# Patient Record
Sex: MALE | Race: WHITE | NOT HISPANIC OR LATINO
[De-identification: names, ages, dates, MRNs, and addresses within clinical notes are randomized per-mention and may not be internally consistent; named-entity substitution may affect disease eponyms.]

---

## 2017-04-25 ENCOUNTER — RX RENEWAL (OUTPATIENT)
Age: 67
End: 2017-04-25

## 2017-06-06 ENCOUNTER — APPOINTMENT (OUTPATIENT)
Dept: FAMILY MEDICINE | Facility: CLINIC | Age: 67
End: 2017-06-06

## 2017-06-06 VITALS
WEIGHT: 210 LBS | RESPIRATION RATE: 16 BRPM | BODY MASS INDEX: 31.83 KG/M2 | OXYGEN SATURATION: 98 % | HEIGHT: 68 IN | DIASTOLIC BLOOD PRESSURE: 72 MMHG | SYSTOLIC BLOOD PRESSURE: 148 MMHG | HEART RATE: 63 BPM

## 2017-06-06 VITALS — HEART RATE: 60 BPM | RESPIRATION RATE: 16 BRPM | DIASTOLIC BLOOD PRESSURE: 80 MMHG | SYSTOLIC BLOOD PRESSURE: 130 MMHG

## 2017-07-12 ENCOUNTER — RX RENEWAL (OUTPATIENT)
Age: 67
End: 2017-07-12

## 2017-10-10 ENCOUNTER — APPOINTMENT (OUTPATIENT)
Dept: FAMILY MEDICINE | Facility: CLINIC | Age: 67
End: 2017-10-10
Payer: MEDICARE

## 2017-10-10 ENCOUNTER — MED ADMIN CHARGE (OUTPATIENT)
Age: 67
End: 2017-10-10

## 2017-10-10 PROCEDURE — 90662 IIV NO PRSV INCREASED AG IM: CPT

## 2017-10-10 PROCEDURE — G0008: CPT

## 2017-10-18 ENCOUNTER — RX RENEWAL (OUTPATIENT)
Age: 67
End: 2017-10-18

## 2017-10-20 ENCOUNTER — RESULT REVIEW (OUTPATIENT)
Age: 67
End: 2017-10-20

## 2017-10-20 ENCOUNTER — RX RENEWAL (OUTPATIENT)
Age: 67
End: 2017-10-20

## 2017-12-20 ENCOUNTER — NON-APPOINTMENT (OUTPATIENT)
Age: 67
End: 2017-12-20

## 2017-12-20 ENCOUNTER — APPOINTMENT (OUTPATIENT)
Dept: FAMILY MEDICINE | Facility: CLINIC | Age: 67
End: 2017-12-20
Payer: MEDICARE

## 2017-12-20 VITALS
SYSTOLIC BLOOD PRESSURE: 132 MMHG | BODY MASS INDEX: 32.74 KG/M2 | WEIGHT: 216 LBS | DIASTOLIC BLOOD PRESSURE: 66 MMHG | HEIGHT: 68 IN

## 2017-12-20 VITALS — HEART RATE: 60 BPM | DIASTOLIC BLOOD PRESSURE: 70 MMHG | RESPIRATION RATE: 16 BRPM | SYSTOLIC BLOOD PRESSURE: 130 MMHG

## 2017-12-20 PROCEDURE — G0009: CPT

## 2017-12-20 PROCEDURE — 90732 PPSV23 VACC 2 YRS+ SUBQ/IM: CPT

## 2017-12-20 PROCEDURE — 93000 ELECTROCARDIOGRAM COMPLETE: CPT | Mod: 59

## 2017-12-20 PROCEDURE — G0439: CPT

## 2018-06-05 ENCOUNTER — APPOINTMENT (OUTPATIENT)
Dept: FAMILY MEDICINE | Facility: CLINIC | Age: 68
End: 2018-06-05
Payer: MEDICARE

## 2018-06-05 ENCOUNTER — LABORATORY RESULT (OUTPATIENT)
Age: 68
End: 2018-06-05

## 2018-06-05 VITALS — SYSTOLIC BLOOD PRESSURE: 120 MMHG | HEART RATE: 72 BPM | DIASTOLIC BLOOD PRESSURE: 70 MMHG | RESPIRATION RATE: 16 BRPM

## 2018-06-05 VITALS
WEIGHT: 211.25 LBS | HEART RATE: 67 BPM | SYSTOLIC BLOOD PRESSURE: 136 MMHG | HEIGHT: 68 IN | RESPIRATION RATE: 16 BRPM | OXYGEN SATURATION: 98 % | DIASTOLIC BLOOD PRESSURE: 70 MMHG | BODY MASS INDEX: 32.02 KG/M2

## 2018-06-05 PROCEDURE — 99214 OFFICE O/P EST MOD 30 MIN: CPT | Mod: 25

## 2018-06-05 PROCEDURE — 36415 COLL VENOUS BLD VENIPUNCTURE: CPT

## 2018-06-05 NOTE — PHYSICAL EXAM
[No Acute Distress] : no acute distress [Normal Voice/Communication] : normal voice/communication [Normal Sclera/Conjunctiva] : normal sclera/conjunctiva [Normal Outer Ear/Nose] : the outer ears and nose were normal in appearance [No JVD] : no jugular venous distention [No Respiratory Distress] : no respiratory distress  [Normal Rate] : normal rate  [Regular Rhythm] : with a regular rhythm [No Murmur] : no murmur heard [No Edema] : there was no peripheral edema [Soft] : abdomen soft [Non Tender] : non-tender [Non-distended] : non-distended [No HSM] : no HSM [Normal Supraclavicular Nodes] : no supraclavicular lymphadenopathy [Normal Posterior Cervical Nodes] : no posterior cervical lymphadenopathy [Normal Anterior Cervical Nodes] : no anterior cervical lymphadenopathy [No Joint Swelling] : no joint swelling [No Rash] : no rash [Normal Gait] : normal gait [Speech Grossly Normal] : speech grossly normal

## 2018-06-05 NOTE — HISTORY OF PRESENT ILLNESS
[Hyperlipidemia] : Hyperlipidemia [Hypertension] : Hypertension [Other: ___] : [unfilled] [Does not check BP] : The patient is not checking blood pressure [<130/90] : Target blood pressure is  <130/90 [Target goal met] : BP target goal met [Doing Well] : Patient is doing well [Low Intensity Therapy] : Patient is currently on low intensity statin  therapy [FreeTextEntry6] : still having  knee pain seeing  ortho had 2 partial knee  replacement  20  yrs  ago  has  multiple  bites on leg and thigh was in  crawl space

## 2018-06-06 LAB
ALBUMIN SERPL ELPH-MCNC: 4 G/DL
ALP BLD-CCNC: 69 U/L
ALT SERPL-CCNC: 5 U/L
ANION GAP SERPL CALC-SCNC: 15 MMOL/L
APPEARANCE: CLEAR
AST SERPL-CCNC: 14 U/L
B BURGDOR IGG+IGM SER QL IB: NORMAL
BACTERIA: NEGATIVE
BASOPHILS # BLD AUTO: 0.03 K/UL
BASOPHILS NFR BLD AUTO: 0.5 %
BILIRUB SERPL-MCNC: 0.4 MG/DL
BILIRUBIN URINE: NEGATIVE
BLOOD URINE: ABNORMAL
BUN SERPL-MCNC: 20 MG/DL
CALCIUM SERPL-MCNC: 9.7 MG/DL
CHLORIDE SERPL-SCNC: 107 MMOL/L
CHOLEST SERPL-MCNC: 150 MG/DL
CHOLEST/HDLC SERPL: 2.7 RATIO
CO2 SERPL-SCNC: 23 MMOL/L
COLOR: YELLOW
CREAT SERPL-MCNC: 1.01 MG/DL
EOSINOPHIL # BLD AUTO: 0.11 K/UL
EOSINOPHIL NFR BLD AUTO: 2 %
GLUCOSE QUALITATIVE U: NEGATIVE MG/DL
GLUCOSE SERPL-MCNC: 89 MG/DL
HCT VFR BLD CALC: 43.7 %
HDLC SERPL-MCNC: 56 MG/DL
HGB BLD-MCNC: 14 G/DL
HYALINE CASTS: 0 /LPF
IMM GRANULOCYTES NFR BLD AUTO: 0.2 %
KETONES URINE: NEGATIVE
LDLC SERPL CALC-MCNC: 63 MG/DL
LEUKOCYTE ESTERASE URINE: NEGATIVE
LYMPHOCYTES # BLD AUTO: 1.08 K/UL
LYMPHOCYTES NFR BLD AUTO: 19.5 %
MAN DIFF?: NORMAL
MCHC RBC-ENTMCNC: 30.8 PG
MCHC RBC-ENTMCNC: 32 GM/DL
MCV RBC AUTO: 96 FL
MICROSCOPIC-UA: NORMAL
MONOCYTES # BLD AUTO: 0.62 K/UL
MONOCYTES NFR BLD AUTO: 11.2 %
NEUTROPHILS # BLD AUTO: 3.68 K/UL
NEUTROPHILS NFR BLD AUTO: 66.6 %
NITRITE URINE: NEGATIVE
PH URINE: 5.5
PLATELET # BLD AUTO: 143 K/UL
POTASSIUM SERPL-SCNC: 4.3 MMOL/L
PROT SERPL-MCNC: 7.2 G/DL
PROTEIN URINE: NEGATIVE MG/DL
RBC # BLD: 4.55 M/UL
RBC # FLD: 14.4 %
RED BLOOD CELLS URINE: 1 /HPF
SODIUM SERPL-SCNC: 145 MMOL/L
SPECIFIC GRAVITY URINE: 1.02
SQUAMOUS EPITHELIAL CELLS: 0 /HPF
T4 SERPL-MCNC: 8.4 UG/DL
TRIGL SERPL-MCNC: 155 MG/DL
TSH SERPL-ACNC: 0.81 UIU/ML
URATE SERPL-MCNC: 6.3 MG/DL
UROBILINOGEN URINE: NEGATIVE MG/DL
WBC # FLD AUTO: 5.53 K/UL
WHITE BLOOD CELLS URINE: 0 /HPF

## 2018-08-22 ENCOUNTER — APPOINTMENT (OUTPATIENT)
Dept: FAMILY MEDICINE | Facility: CLINIC | Age: 68
End: 2018-08-22
Payer: MEDICARE

## 2018-08-22 VITALS
BODY MASS INDEX: 31.83 KG/M2 | WEIGHT: 210 LBS | SYSTOLIC BLOOD PRESSURE: 130 MMHG | DIASTOLIC BLOOD PRESSURE: 80 MMHG | HEIGHT: 68 IN

## 2018-08-22 PROCEDURE — 12001 RPR S/N/AX/GEN/TRNK 2.5CM/<: CPT

## 2018-08-22 PROCEDURE — 99213 OFFICE O/P EST LOW 20 MIN: CPT | Mod: 25

## 2018-08-22 NOTE — ASSESSMENT
[FreeTextEntry1] : laceration  through left  4th  digit  2.5  cm sutured  closed with 3  4-0 nylon  sutures under local and  tube  gauze applied rtc  3-5  days

## 2018-08-27 ENCOUNTER — APPOINTMENT (OUTPATIENT)
Dept: FAMILY MEDICINE | Facility: CLINIC | Age: 68
End: 2018-08-27
Payer: MEDICARE

## 2018-08-27 VITALS
SYSTOLIC BLOOD PRESSURE: 138 MMHG | WEIGHT: 217.38 LBS | HEIGHT: 68 IN | DIASTOLIC BLOOD PRESSURE: 82 MMHG | HEART RATE: 62 BPM | BODY MASS INDEX: 32.94 KG/M2 | OXYGEN SATURATION: 97 %

## 2018-08-27 PROCEDURE — 99212 OFFICE O/P EST SF 10 MIN: CPT

## 2018-08-27 NOTE — HISTORY OF PRESENT ILLNESS
[FreeTextEntry1] : f/u  dog  bite  [de-identified] : no pain   wound  looks  good no sign of  infection sutures in place wound  closed

## 2018-09-04 ENCOUNTER — APPOINTMENT (OUTPATIENT)
Dept: FAMILY MEDICINE | Facility: CLINIC | Age: 68
End: 2018-09-04
Payer: MEDICARE

## 2018-09-04 VITALS — SYSTOLIC BLOOD PRESSURE: 132 MMHG | DIASTOLIC BLOOD PRESSURE: 80 MMHG

## 2018-09-04 DIAGNOSIS — S61.219A LACERATION W/OUT FOREIGN BODY OF UNSPECIFIED FINGER W/OUT DAMAGE TO NAIL, INITIAL ENCOUNTER: ICD-10-CM

## 2018-09-04 PROCEDURE — 99212 OFFICE O/P EST SF 10 MIN: CPT

## 2018-09-04 NOTE — HISTORY OF PRESENT ILLNESS
[FreeTextEntry1] : f/u  dog  bite  [de-identified] : sutures  removed  from left  4th  digit wound  closed  healed  well wound  steri  stripped

## 2018-10-03 ENCOUNTER — MED ADMIN CHARGE (OUTPATIENT)
Age: 68
End: 2018-10-03

## 2018-10-03 ENCOUNTER — APPOINTMENT (OUTPATIENT)
Dept: FAMILY MEDICINE | Facility: CLINIC | Age: 68
End: 2018-10-03
Payer: MEDICARE

## 2018-10-03 PROCEDURE — G0008: CPT

## 2018-10-03 PROCEDURE — 90662 IIV NO PRSV INCREASED AG IM: CPT

## 2019-01-07 ENCOUNTER — APPOINTMENT (OUTPATIENT)
Dept: FAMILY MEDICINE | Facility: CLINIC | Age: 69
End: 2019-01-07
Payer: MEDICARE

## 2019-01-07 VITALS — RESPIRATION RATE: 16 BRPM | SYSTOLIC BLOOD PRESSURE: 120 MMHG | DIASTOLIC BLOOD PRESSURE: 80 MMHG | HEART RATE: 60 BPM

## 2019-01-07 VITALS
HEART RATE: 58 BPM | HEIGHT: 68 IN | TEMPERATURE: 98.4 F | SYSTOLIC BLOOD PRESSURE: 118 MMHG | BODY MASS INDEX: 31.83 KG/M2 | DIASTOLIC BLOOD PRESSURE: 70 MMHG | OXYGEN SATURATION: 97 % | WEIGHT: 210 LBS | RESPIRATION RATE: 16 BRPM

## 2019-01-07 PROCEDURE — 99214 OFFICE O/P EST MOD 30 MIN: CPT

## 2019-01-07 RX ORDER — AMOXICILLIN AND CLAVULANATE POTASSIUM 875; 125 MG/1; MG/1
875-125 TABLET, COATED ORAL
Qty: 14 | Refills: 0 | Status: COMPLETED | COMMUNITY
Start: 2018-08-22 | End: 2019-01-07

## 2019-01-07 NOTE — HISTORY OF PRESENT ILLNESS
[Hyperlipidemia] : Hyperlipidemia [Hypertension] : Hypertension [Other: ___] : [unfilled] [Cold Symptoms] : cold symptoms [Moderate] : moderate [___ Days ago] : [unfilled] days ago [Congestion] : congestion [Cough] : cough [Does not check BP] : The patient is not checking blood pressure [<130/90] : Target blood pressure is  <130/90 [Target goal met] : BP target goal met [Doing Well] : Patient is doing well [Low Intensity Therapy] : Patient is currently on low intensity statin  therapy [Sore Throat] : no sore throat

## 2019-09-05 ENCOUNTER — APPOINTMENT (OUTPATIENT)
Dept: FAMILY MEDICINE | Facility: CLINIC | Age: 69
End: 2019-09-05
Payer: MEDICARE

## 2019-09-05 VITALS — SYSTOLIC BLOOD PRESSURE: 130 MMHG | DIASTOLIC BLOOD PRESSURE: 80 MMHG | HEART RATE: 72 BPM | RESPIRATION RATE: 16 BRPM

## 2019-09-05 VITALS
HEART RATE: 66 BPM | BODY MASS INDEX: 33.04 KG/M2 | OXYGEN SATURATION: 97 % | RESPIRATION RATE: 16 BRPM | SYSTOLIC BLOOD PRESSURE: 132 MMHG | HEIGHT: 68 IN | DIASTOLIC BLOOD PRESSURE: 73 MMHG | WEIGHT: 218 LBS

## 2019-09-05 DIAGNOSIS — H33.20 SEROUS RETINAL DETACHMENT, UNSPECIFIED EYE: ICD-10-CM

## 2019-09-05 PROCEDURE — 99214 OFFICE O/P EST MOD 30 MIN: CPT

## 2019-09-05 RX ORDER — BENZONATATE 200 MG/1
200 CAPSULE ORAL 3 TIMES DAILY
Qty: 30 | Refills: 1 | Status: COMPLETED | COMMUNITY
Start: 2019-01-07 | End: 2019-09-05

## 2019-09-05 RX ORDER — AMOXICILLIN 875 MG/1
875 TABLET, FILM COATED ORAL
Qty: 1 | Refills: 0 | Status: COMPLETED | COMMUNITY
Start: 2019-01-07 | End: 2019-09-05

## 2019-09-05 NOTE — PHYSICAL EXAM
[No Acute Distress] : no acute distress [PERRL] : pupils equal round and reactive to light [Normal Oropharynx] : the oropharynx was normal [No Lymphadenopathy] : no lymphadenopathy [No Accessory Muscle Use] : no accessory muscle use [Regular Rhythm] : with a regular rhythm [No Edema] : there was no peripheral edema [Soft] : abdomen soft [No HSM] : no HSM [Normal] : affect was normal and insight and judgment were intact

## 2019-09-05 NOTE — HISTORY OF PRESENT ILLNESS
[Hyperlipidemia] : Hyperlipidemia [Hypertension] : Hypertension [Other: ___] : [unfilled] [Does not check BP] : The patient is not checking blood pressure [<130/90] : Target blood pressure is  <130/90 [Target goal met] : BP target goal met [Doing Well] : Patient is doing well [Managed with medications] : managed with  medication [Low Intensity Therapy] : Patient is currently on low intensity statin  therapy [FreeTextEntry6] : has  trouble with eyes  has glaucoma and retinal detachment may need  glaucoma surgery   started on methazolamide 50 mg bid  sees onc  q year concerned about  new  glaucoma med methalamide

## 2019-09-05 NOTE — ASSESSMENT
[FreeTextEntry1] : leukemia  cured  medically stable  continue all meds\par will monitor  labs  with new  glaucoma  med bp and lipids at goal

## 2019-10-03 ENCOUNTER — APPOINTMENT (OUTPATIENT)
Dept: FAMILY MEDICINE | Facility: CLINIC | Age: 69
End: 2019-10-03
Payer: MEDICARE

## 2019-10-03 PROCEDURE — G0008: CPT

## 2019-10-03 PROCEDURE — 90662 IIV NO PRSV INCREASED AG IM: CPT

## 2020-06-11 ENCOUNTER — APPOINTMENT (OUTPATIENT)
Dept: FAMILY MEDICINE | Facility: CLINIC | Age: 70
End: 2020-06-11
Payer: MEDICARE

## 2020-06-11 VITALS
BODY MASS INDEX: 32.43 KG/M2 | WEIGHT: 214 LBS | HEART RATE: 61 BPM | SYSTOLIC BLOOD PRESSURE: 126 MMHG | HEIGHT: 68 IN | OXYGEN SATURATION: 98 % | DIASTOLIC BLOOD PRESSURE: 80 MMHG | TEMPERATURE: 98.5 F

## 2020-06-11 PROCEDURE — 99213 OFFICE O/P EST LOW 20 MIN: CPT

## 2020-08-07 ENCOUNTER — APPOINTMENT (OUTPATIENT)
Dept: FAMILY MEDICINE | Facility: CLINIC | Age: 70
End: 2020-08-07
Payer: MEDICARE

## 2020-08-07 VITALS
OXYGEN SATURATION: 98 % | DIASTOLIC BLOOD PRESSURE: 62 MMHG | HEART RATE: 59 BPM | SYSTOLIC BLOOD PRESSURE: 120 MMHG | BODY MASS INDEX: 31.52 KG/M2 | HEIGHT: 68 IN | WEIGHT: 208 LBS

## 2020-08-07 PROCEDURE — 99213 OFFICE O/P EST LOW 20 MIN: CPT | Mod: 25

## 2020-08-07 PROCEDURE — 96372 THER/PROPH/DIAG INJ SC/IM: CPT

## 2020-08-07 RX ORDER — METHYLPRED ACET/NACL,ISO-OS/PF 40 MG/ML
40 VIAL (ML) INJECTION
Qty: 1 | Refills: 0 | Status: COMPLETED | OUTPATIENT
Start: 2020-08-07

## 2020-08-07 RX ADMIN — METHYLPREDNISOLONE ACETATE 0 MG/ML: 40 INJECTION, SUSPENSION INTRA-ARTICULAR; INTRALESIONAL; INTRAMUSCULAR; SOFT TISSUE at 00:00

## 2020-08-07 NOTE — PHYSICAL EXAM
[Normal] : normal rate, regular rhythm, normal S1 and S2 and no murmur heard [de-identified] : Rash on right wrist and both hands. Large area of oozing and crusting on left forearm.

## 2020-08-07 NOTE — HISTORY OF PRESENT ILLNESS
[FreeTextEntry8] : Rash on left forearm and hands after gardening and contact with poison ivy. Large blistered oozing area on left forearm. Very itchy, using anti-itch cream. \par Needs refill on medications and due for annual in September. 
Statement Selected

## 2020-09-01 ENCOUNTER — APPOINTMENT (OUTPATIENT)
Dept: FAMILY MEDICINE | Facility: CLINIC | Age: 70
End: 2020-09-01
Payer: MEDICARE

## 2020-09-01 VITALS
OXYGEN SATURATION: 98 % | SYSTOLIC BLOOD PRESSURE: 122 MMHG | HEART RATE: 59 BPM | WEIGHT: 209 LBS | HEIGHT: 68 IN | DIASTOLIC BLOOD PRESSURE: 82 MMHG | BODY MASS INDEX: 31.67 KG/M2
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
## 2020-09-01 DIAGNOSIS — S61.452A OPEN BITE OF LEFT HAND, INITIAL ENCOUNTER: ICD-10-CM
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
## 2020-09-01 DIAGNOSIS — R07.81 PLEURODYNIA: ICD-10-CM

## 2020-09-01 DIAGNOSIS — Z87.09 PERSONAL HISTORY OF OTHER DISEASES OF THE RESPIRATORY SYSTEM: ICD-10-CM
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
## 2020-09-01 DIAGNOSIS — L23.7 ALLERGIC CONTACT DERMATITIS DUE TO PLANTS, EXCEPT FOOD: ICD-10-CM

## 2020-09-01 DIAGNOSIS — Z01.818 ENCOUNTER FOR OTHER PREPROCEDURAL EXAMINATION: ICD-10-CM

## 2020-09-01 PROCEDURE — 99213 OFFICE O/P EST LOW 20 MIN: CPT

## 2020-09-01 RX ORDER — DOXYCYCLINE HYCLATE 100 MG/1
100 CAPSULE ORAL
Qty: 10 | Refills: 0 | Status: DISCONTINUED | COMMUNITY
Start: 2020-06-11 | End: 2020-09-01

## 2020-09-01 NOTE — PLAN
[FreeTextEntry1] : Continue with Aleve for pain. Topical pain relief,\par Continue with steroid cream for bug bite itching.

## 2020-09-01 NOTE — HISTORY OF PRESENT ILLNESS
[Spouse] : spouse [FreeTextEntry8] : Fell off the couch when sleeping and landed on the floor four days ago. Pain left lateral chest with movement. Able to take deep breaths, no SOB.\par Pain when trying to drive. Relief with Aleve. No bruising noted. \par Did not hit head, no LOC. \par Itchy bug bites on left inner thigh. House checked for bug bites but none found. Busy in garden outside. Itching resolved with steroid cream.

## 2020-09-01 NOTE — PHYSICAL EXAM
[Normal] : affect was normal and insight and judgment were intact [de-identified] : Tender left lateral ribs. Tender between ribs at site of bruising. FROM but mild pain with trunk rotation and arm lift.  [de-identified] : Small bruise left lateral chest.  Three bug bite marks on left ineer thigh

## 2020-09-24 ENCOUNTER — APPOINTMENT (OUTPATIENT)
Dept: FAMILY MEDICINE | Facility: CLINIC | Age: 70
End: 2020-09-24
Payer: MEDICARE

## 2020-09-24 DIAGNOSIS — Z23 ENCOUNTER FOR IMMUNIZATION: ICD-10-CM

## 2020-09-24 PROCEDURE — 90662 IIV NO PRSV INCREASED AG IM: CPT

## 2020-09-24 PROCEDURE — G0008: CPT

## 2020-10-06 ENCOUNTER — APPOINTMENT (OUTPATIENT)
Dept: FAMILY MEDICINE | Facility: CLINIC | Age: 70
End: 2020-10-06

## 2020-11-16 ENCOUNTER — NON-APPOINTMENT (OUTPATIENT)
Age: 70
End: 2020-11-16

## 2020-11-16 ENCOUNTER — APPOINTMENT (OUTPATIENT)
Dept: FAMILY MEDICINE | Facility: CLINIC | Age: 70
End: 2020-11-16
Payer: MEDICARE

## 2020-11-16 VITALS
HEART RATE: 58 BPM | SYSTOLIC BLOOD PRESSURE: 132 MMHG | WEIGHT: 217.5 LBS | TEMPERATURE: 98 F | OXYGEN SATURATION: 98 % | HEIGHT: 68 IN | BODY MASS INDEX: 32.96 KG/M2 | DIASTOLIC BLOOD PRESSURE: 70 MMHG

## 2020-11-16 VITALS — HEART RATE: 72 BPM | DIASTOLIC BLOOD PRESSURE: 72 MMHG | SYSTOLIC BLOOD PRESSURE: 130 MMHG | RESPIRATION RATE: 16 BRPM

## 2020-11-16 PROCEDURE — G0439: CPT

## 2020-11-16 PROCEDURE — 99497 ADVNCD CARE PLAN 30 MIN: CPT | Mod: 33

## 2020-11-16 PROCEDURE — 93000 ELECTROCARDIOGRAM COMPLETE: CPT | Mod: 59

## 2020-11-16 PROCEDURE — 99213 OFFICE O/P EST LOW 20 MIN: CPT | Mod: 25

## 2020-11-16 RX ORDER — TRIAMCINOLONE ACETONIDE 1 MG/G
0.1 OINTMENT TOPICAL
Qty: 1 | Refills: 1 | Status: COMPLETED | COMMUNITY
Start: 2020-08-07 | End: 2020-11-16

## 2020-11-16 NOTE — HISTORY OF PRESENT ILLNESS
[Spouse] : spouse [FreeTextEntry1] : pt  here for cpe and  management of HTN HLD  [de-identified] : FEELING WELL has  soft  bm

## 2020-11-16 NOTE — REVIEW OF SYSTEMS
[Vision Problems] : vision problems [Negative] : Heme/Lymph [FreeTextEntry3] : retinal detachment and glaucoma os

## 2020-11-16 NOTE — PHYSICAL EXAM
[No Acute Distress] : no acute distress [PERRL] : pupils equal round and reactive to light [Normal Oropharynx] : the oropharynx was normal [Supple] : supple [Clear to Auscultation] : lungs were clear to auscultation bilaterally [Regular Rhythm] : with a regular rhythm [No Murmur] : no murmur heard [No Edema] : there was no peripheral edema [Soft] : abdomen soft [Non Tender] : non-tender [No HSM] : no HSM [Urethral Meatus] : meatus normal [Urinary Bladder Findings] : the bladder was normal on palpation [Scrotum] : the scrotum was normal [Testes Mass (___cm)] : there were no testicular masses [No Prostate Nodules] : no prostate nodules [Normal Supraclavicular Nodes] : no supraclavicular lymphadenopathy [Normal Posterior Cervical Nodes] : no posterior cervical lymphadenopathy [Normal Anterior Cervical Nodes] : no anterior cervical lymphadenopathy [No CVA Tenderness] : no CVA  tenderness [No Spinal Tenderness] : no spinal tenderness [Grossly Normal Strength/Tone] : grossly normal strength/tone [No Skin Lesions] : no skin lesions [Normal Insight/Judgement] : insight and judgment were intact

## 2020-11-16 NOTE — ASSESSMENT
[FreeTextEntry1] : medically stable  continue all meds soft  stool start metamucil medically stable  continue all meds\par lab evaluation\par \par

## 2020-11-16 NOTE — HEALTH RISK ASSESSMENT
[Very Good] : ~his/her~  mood as very good [Yes] : Yes [Monthly or less (1 pt)] : Monthly or less (1 point) [No falls in past year] : Patient reported no falls in the past year [0] : 2) Feeling down, depressed, or hopeless: Not at all (0) [None] : None [With Family] : lives with family [Retired] : retired [College] : College [] :  [# Of Children ___] : has [unfilled] children [Smoke Detector] : smoke detector [Carbon Monoxide Detector] : carbon monoxide detector [Seat Belt] :  uses seat belt [Patient/Caregiver unclear of wishes] : Patient/Caregiver unclear of wishes [] : No [de-identified] : NO  [Change in mental status noted] : No change in mental status noted [Reports changes in hearing] : Reports no changes in hearing [Reports changes in vision] : Reports no changes in vision [Reports changes in dental health] : Reports no changes in dental health [ColonoscopyDate] : 12/18 [AdvancecareDate] : 11/11 [FreeTextEntry4] : has  info

## 2020-12-02 ENCOUNTER — RX RENEWAL (OUTPATIENT)
Age: 70
End: 2020-12-02

## 2021-01-07 ENCOUNTER — APPOINTMENT (OUTPATIENT)
Dept: FAMILY MEDICINE | Facility: CLINIC | Age: 71
End: 2021-01-07
Payer: MEDICARE

## 2021-01-07 DIAGNOSIS — Z20.822 CONTACT WITH AND (SUSPECTED) EXPOSURE TO COVID-19: ICD-10-CM

## 2021-01-07 DIAGNOSIS — B34.9 VIRAL INFECTION, UNSPECIFIED: ICD-10-CM

## 2021-01-07 PROCEDURE — 99211 OFF/OP EST MAY X REQ PHY/QHP: CPT | Mod: CS

## 2021-01-09 ENCOUNTER — RX RENEWAL (OUTPATIENT)
Age: 71
End: 2021-01-09

## 2021-01-09 LAB — SARS-COV-2 N GENE NPH QL NAA+PROBE: NOT DETECTED

## 2021-01-12 PROBLEM — B34.9 VIRAL ILLNESS: Status: ACTIVE | Noted: 2021-01-12

## 2021-01-13 ENCOUNTER — APPOINTMENT (OUTPATIENT)
Dept: FAMILY MEDICINE | Facility: CLINIC | Age: 71
End: 2021-01-13
Payer: MEDICARE

## 2021-01-13 PROCEDURE — 99211 OFF/OP EST MAY X REQ PHY/QHP: CPT | Mod: CS

## 2021-01-15 ENCOUNTER — TRANSCRIPTION ENCOUNTER (OUTPATIENT)
Age: 71
End: 2021-01-15

## 2021-01-15 LAB — SARS-COV-2 N GENE NPH QL NAA+PROBE: DETECTED

## 2021-01-17 ENCOUNTER — APPOINTMENT (OUTPATIENT)
Dept: DISASTER EMERGENCY | Facility: HOSPITAL | Age: 71
End: 2021-01-17

## 2021-01-17 ENCOUNTER — OUTPATIENT (OUTPATIENT)
Dept: OUTPATIENT SERVICES | Facility: HOSPITAL | Age: 71
LOS: 1 days | End: 2021-01-17
Payer: MEDICARE

## 2021-01-17 VITALS
OXYGEN SATURATION: 97 % | WEIGHT: 210.1 LBS | HEIGHT: 68 IN | HEART RATE: 68 BPM | DIASTOLIC BLOOD PRESSURE: 76 MMHG | TEMPERATURE: 98 F | SYSTOLIC BLOOD PRESSURE: 127 MMHG | RESPIRATION RATE: 18 BRPM

## 2021-01-17 VITALS
RESPIRATION RATE: 18 BRPM | SYSTOLIC BLOOD PRESSURE: 143 MMHG | HEART RATE: 62 BPM | OXYGEN SATURATION: 97 % | TEMPERATURE: 99 F | DIASTOLIC BLOOD PRESSURE: 75 MMHG

## 2021-01-17 DIAGNOSIS — U07.1 COVID-19: ICD-10-CM

## 2021-01-17 DIAGNOSIS — Z98.890 OTHER SPECIFIED POSTPROCEDURAL STATES: Chronic | ICD-10-CM

## 2021-01-17 DIAGNOSIS — Z96.659 PRESENCE OF UNSPECIFIED ARTIFICIAL KNEE JOINT: Chronic | ICD-10-CM

## 2021-01-17 DIAGNOSIS — Z41.9 ENCOUNTER FOR PROCEDURE FOR PURPOSES OTHER THAN REMEDYING HEALTH STATE, UNSPECIFIED: Chronic | ICD-10-CM

## 2021-01-17 DIAGNOSIS — Z98.49 CATARACT EXTRACTION STATUS, UNSPECIFIED EYE: Chronic | ICD-10-CM

## 2021-01-17 PROCEDURE — M0243: CPT

## 2021-01-17 NOTE — CHART NOTE - NSCHARTNOTEFT_GEN_A_CORE
CC: Monoclonal Antibody Infusion/COVID 19 Positive  70y Male          PE:   Appearance: NAD	  HEENT:   Normal oral mucosa,   Lymphatic: No lymphadenopathy  Cardiovascular: Normal S1 S2, No JVD, No murmurs, No edema  Respiratory: Lungs clear to auscultation	  Gastrointestinal:  Soft, Non-tender, + BS	  Skin: warm and dry, no rash or urticaria   Neurologic: Non-focal  Extremities: Normal range of motion,    ASSESSMENT:  Pt is a 69 yo male with a PMHx of Hypertension, Glaucoma , HLD on Atenelol, Amlodipine , HCTZ  Covid + on 1/12/21  referred by Dr. Ezio Randall who presents to infusion center for Monoclonal antibody infusion (Regeneron).    Symptoms/ Criteria: Fever, congestion,headaches  Risk Profile includes: age > 64 yo    PLAN:  - infusion procedure explained to patient I have reviewed the Regeneron Emergency Use Authorization (EUA) and I have provided the patient or patient's caregiver with the following information:      1. FDA has authorized emergency use of Regeneron( Casirivimab and Imdevimab), which is not an FDA-approved biological product.  2. The patient or patient's caregiver has the option to accept or refuse administration of Regeneron.  3. The significant known and potential risks and benefits of Regeneron and the extent to which such risks and benefits are unknown.  4. Information on available alternative treatments were discussed and recommended to visit https://www.zdgga24plumpsaeyuutvxlbwnk.nih.gov/ for further understanding    - Consent for monoclonal antibody infusion obtained   - Risk & benefits discussed/all questions answered  - infuse Regeneron (casirivimab 1200mg & imdevimab 1200mg) IV over one hour  - observe patient for one hour post infusion, and then if stable discharge home with oupt follow up as planned by PMD.    ADDENDUM CC: Monoclonal Antibody Infusion/COVID 19 Positive  70y Male    Vital Signs Last 24 Hrs  T(C): 37 (17 Jan 2021 14:02), Max: 37.1 (17 Jan 2021 12:00)  T(F): 98.6 (17 Jan 2021 14:02), Max: 98.7 (17 Jan 2021 12:00)  HR: 62 (17 Jan 2021 14:02) (59 - 68)  BP: 143/75 (17 Jan 2021 14:02) (127/76 - 143/75)  BP(mean): --  RR: 18 (17 Jan 2021 14:02) (18 - 18)  SpO2: 97% (17 Jan 2021 14:02) (96% - 97%)      PE:   Appearance: NAD	  HEENT:   Normal oral mucosa,   Lymphatic: No lymphadenopathy  Cardiovascular: Normal S1 S2, No JVD, No murmurs, No edema  Respiratory: Lungs clear to auscultation	  Gastrointestinal:  Soft, Non-tender, + BS	  Skin: warm and dry, no rash or urticaria   Neurologic: Non-focal  Extremities: Normal range of motion,    ASSESSMENT:  Pt is a 71 yo male with a PMHx of Hypertension, Glaucoma , HLD on Atenelol, Amlodipine , HCTZ  Covid + on 1/12/21  referred by Dr. Ezio Randall who presents to infusion center for Monoclonal antibody infusion (Regeneron).    Symptoms/ Criteria: Fever, congestion,headaches  Risk Profile includes: age > 64 yo    PLAN:  - infusion procedure explained to patient I have reviewed the Regeneron Emergency Use Authorization (EUA) and I have provided the patient or patient's caregiver with the following information:      1. FDA has authorized emergency use of Regeneron( Casirivimab and Imdevimab), which is not an FDA-approved biological product.  2. The patient or patient's caregiver has the option to accept or refuse administration of Regeneron.  3. The significant known and potential risks and benefits of Regeneron and the extent to which such risks and benefits are unknown.  4. Information on available alternative treatments were discussed and recommended to visit https://www.xqlof01qpdujzzcwesgdacfxmz.nih.gov/ for further understanding    - Consent for monoclonal antibody infusion obtained   - Risk & benefits discussed/all questions answered  - infuse Regeneron (casirivimab 1200mg & imdevimab 1200mg) IV over one hour  - observe patient for one hour post infusion, and then if stable discharge home with oupt follow up as planned by PMD.    ADDENDUM    Regeneron infusion and 1 hour post infusion protocol complete.  Pt is stable and offers no new complaints.  VSS/PE unchanged    Vital Signs Last 24 Hrs  T(C): 37 (17 Jan 2021 14:02), Max: 37.1 (17 Jan 2021 12:00)  T(F): 98.6 (17 Jan 2021 14:02), Max: 98.7 (17 Jan 2021 12:00)  HR: 62 (17 Jan 2021 14:02) (59 - 68)  BP: 143/75 (17 Jan 2021 14:02) (127/76 - 143/75)  BP(mean): --  RR: 18 (17 Jan 2021 14:02) (18 - 18)  SpO2: 97% (17 Jan 2021 14:02) (96% - 97%)        - Pt is stable for discharge  - Discharge instructions reviewed with pt, all questions answered

## 2021-01-18 ENCOUNTER — TRANSCRIPTION ENCOUNTER (OUTPATIENT)
Age: 71
End: 2021-01-18

## 2021-01-26 ENCOUNTER — TRANSCRIPTION ENCOUNTER (OUTPATIENT)
Age: 71
End: 2021-01-26

## 2021-04-08 ENCOUNTER — APPOINTMENT (OUTPATIENT)
Dept: FAMILY MEDICINE | Facility: CLINIC | Age: 71
End: 2021-04-08
Payer: MEDICARE

## 2021-04-08 VITALS
TEMPERATURE: 97.7 F | OXYGEN SATURATION: 98 % | WEIGHT: 210 LBS | BODY MASS INDEX: 31.83 KG/M2 | HEART RATE: 59 BPM | HEIGHT: 68 IN | DIASTOLIC BLOOD PRESSURE: 70 MMHG | SYSTOLIC BLOOD PRESSURE: 130 MMHG

## 2021-04-08 VITALS — HEART RATE: 72 BPM | RESPIRATION RATE: 16 BRPM | SYSTOLIC BLOOD PRESSURE: 110 MMHG | DIASTOLIC BLOOD PRESSURE: 70 MMHG

## 2021-04-08 DIAGNOSIS — H40.9 UNSPECIFIED GLAUCOMA: ICD-10-CM

## 2021-04-08 PROCEDURE — 99214 OFFICE O/P EST MOD 30 MIN: CPT | Mod: CS

## 2021-04-08 NOTE — HISTORY OF PRESENT ILLNESS
[Hyperlipidemia] : Hyperlipidemia [Hypertension] : Hypertension [Spouse] : spouse [FreeTextEntry6] : being treated for glaucoma and retinal detachment on methalazone

## 2021-04-08 NOTE — PHYSICAL EXAM
[PERRL] : pupils equal round and reactive to light [Normal Oropharynx] : the oropharynx was normal [Supple] : supple [Clear to Auscultation] : lungs were clear to auscultation bilaterally [Regular Rhythm] : with a regular rhythm [No Murmur] : no murmur heard [No Edema] : there was no peripheral edema [Soft] : abdomen soft [Non Tender] : non-tender [No HSM] : no HSM [Normal Supraclavicular Nodes] : no supraclavicular lymphadenopathy [Normal Posterior Cervical Nodes] : no posterior cervical lymphadenopathy [Normal Anterior Cervical Nodes] : no anterior cervical lymphadenopathy [No CVA Tenderness] : no CVA  tenderness [No Spinal Tenderness] : no spinal tenderness [Grossly Normal Strength/Tone] : grossly normal strength/tone [No Skin Lesions] : no skin lesions [Normal Insight/Judgement] : insight and judgment were intact

## 2021-04-08 NOTE — REVIEW OF SYSTEMS
[Vision Problems] : vision problems [Negative] : Heme/Lymph [Chest Pain] : no chest pain [Palpitations] : no palpitations [Shortness Of Breath] : no shortness of breath [Abdominal Pain] : no abdominal pain [FreeTextEntry3] : retinal detachment and glaucoma os

## 2021-04-08 NOTE — ASSESSMENT
[FreeTextEntry1] : pt on 2 diuretics of glaucoma and htn need to have renal function monitored lab evaluation\par

## 2021-04-09 LAB
ALBUMIN SERPL ELPH-MCNC: 4.4 G/DL
ALP BLD-CCNC: 75 U/L
ALT SERPL-CCNC: 5 U/L
ANION GAP SERPL CALC-SCNC: 10 MMOL/L
APPEARANCE: CLEAR
AST SERPL-CCNC: 17 U/L
BACTERIA: NEGATIVE
BASOPHILS # BLD AUTO: 0.07 K/UL
BASOPHILS NFR BLD AUTO: 1.1 %
BILIRUB SERPL-MCNC: 0.5 MG/DL
BILIRUBIN URINE: NEGATIVE
BLOOD URINE: NEGATIVE
BUN SERPL-MCNC: 16 MG/DL
CALCIUM SERPL-MCNC: 9.2 MG/DL
CHLORIDE SERPL-SCNC: 108 MMOL/L
CHOLEST SERPL-MCNC: 160 MG/DL
CO2 SERPL-SCNC: 26 MMOL/L
COLOR: YELLOW
COVID-19 NUCLEOCAPSID  GAM ANTIBODY INTERPRETATION: POSITIVE
CREAT SERPL-MCNC: 1.07 MG/DL
EOSINOPHIL # BLD AUTO: 0.14 K/UL
EOSINOPHIL NFR BLD AUTO: 2.2 %
GLUCOSE QUALITATIVE U: NEGATIVE
GLUCOSE SERPL-MCNC: 56 MG/DL
HCT VFR BLD CALC: 44.8 %
HDLC SERPL-MCNC: 33 MG/DL
HGB BLD-MCNC: 14.4 G/DL
HYALINE CASTS: 1 /LPF
IMM GRANULOCYTES NFR BLD AUTO: 0.2 %
KETONES URINE: NEGATIVE
LDLC SERPL CALC-MCNC: 88 MG/DL
LEUKOCYTE ESTERASE URINE: NEGATIVE
LYMPHOCYTES # BLD AUTO: 1.57 K/UL
LYMPHOCYTES NFR BLD AUTO: 25.2 %
MAN DIFF?: NORMAL
MCHC RBC-ENTMCNC: 30.7 PG
MCHC RBC-ENTMCNC: 32.1 GM/DL
MCV RBC AUTO: 95.5 FL
MICROSCOPIC-UA: NORMAL
MONOCYTES # BLD AUTO: 0.85 K/UL
MONOCYTES NFR BLD AUTO: 13.6 %
NEUTROPHILS # BLD AUTO: 3.59 K/UL
NEUTROPHILS NFR BLD AUTO: 57.7 %
NITRITE URINE: NEGATIVE
NONHDLC SERPL-MCNC: 127 MG/DL
PH URINE: 7.5
PLATELET # BLD AUTO: 148 K/UL
POTASSIUM SERPL-SCNC: 5.6 MMOL/L
PROT SERPL-MCNC: 7.1 G/DL
PROTEIN URINE: NORMAL
RBC # BLD: 4.69 M/UL
RBC # FLD: 14.7 %
RED BLOOD CELLS URINE: 1 /HPF
SARS-COV-2 AB SERPL QL IA: 183 INDEX
SODIUM SERPL-SCNC: 144 MMOL/L
SPECIFIC GRAVITY URINE: 1.02
SQUAMOUS EPITHELIAL CELLS: 0 /HPF
T4 SERPL-MCNC: 9 UG/DL
TRIGL SERPL-MCNC: 193 MG/DL
TSH SERPL-ACNC: 1.07 UIU/ML
URATE SERPL-MCNC: 6.2 MG/DL
UROBILINOGEN URINE: NORMAL
WBC # FLD AUTO: 6.23 K/UL
WHITE BLOOD CELLS URINE: 0 /HPF

## 2021-06-11 ENCOUNTER — APPOINTMENT (OUTPATIENT)
Dept: FAMILY MEDICINE | Facility: CLINIC | Age: 71
End: 2021-06-11
Payer: MEDICARE

## 2021-06-11 VITALS
SYSTOLIC BLOOD PRESSURE: 134 MMHG | WEIGHT: 210 LBS | BODY MASS INDEX: 31.83 KG/M2 | HEIGHT: 68 IN | TEMPERATURE: 97.9 F | HEART RATE: 67 BPM | DIASTOLIC BLOOD PRESSURE: 80 MMHG | OXYGEN SATURATION: 98 %

## 2021-06-11 PROCEDURE — 99213 OFFICE O/P EST LOW 20 MIN: CPT

## 2021-06-11 NOTE — ASSESSMENT
[FreeTextEntry1] : most likely 2.2 bug bite\par cont doxy for another 5 days \par cont to monitor\par use warm compresses prn \par follow up if no improvement

## 2021-06-11 NOTE — HISTORY OF PRESENT ILLNESS
[FreeTextEntry8] : PT presenting for followup of bug bite. \par Had tick bite on right shoulder 2 days ago, took 2 days of abx  seemed to improve now worsening\par pain around area, some pus and discharge and more red\par no fevers or chills\par no fatigue but arm where bite is hurts

## 2021-06-11 NOTE — PHYSICAL EXAM
[Normal] : no acute distress, well nourished, well developed and well-appearing [de-identified] : red area on right shoulder with clear discharge

## 2021-07-07 RX ORDER — HYDROCORTISONE VALERATE 2 MG/G
0.2 CREAM TOPICAL TWICE DAILY
Qty: 1 | Refills: 1 | Status: ACTIVE | COMMUNITY
Start: 2021-07-07 | End: 1900-01-01

## 2021-07-08 ENCOUNTER — APPOINTMENT (OUTPATIENT)
Dept: FAMILY MEDICINE | Facility: CLINIC | Age: 71
End: 2021-07-08
Payer: MEDICARE

## 2021-07-08 VITALS
TEMPERATURE: 97 F | DIASTOLIC BLOOD PRESSURE: 80 MMHG | OXYGEN SATURATION: 98 % | SYSTOLIC BLOOD PRESSURE: 138 MMHG | HEIGHT: 68 IN | BODY MASS INDEX: 31.83 KG/M2 | HEART RATE: 89 BPM | WEIGHT: 210 LBS

## 2021-07-08 VITALS — RESPIRATION RATE: 16 BRPM | SYSTOLIC BLOOD PRESSURE: 130 MMHG | HEART RATE: 76 BPM | DIASTOLIC BLOOD PRESSURE: 80 MMHG

## 2021-07-08 DIAGNOSIS — L23.7 ALLERGIC CONTACT DERMATITIS DUE TO PLANTS, EXCEPT FOOD: ICD-10-CM

## 2021-07-08 PROCEDURE — 96372 THER/PROPH/DIAG INJ SC/IM: CPT

## 2021-07-08 PROCEDURE — 99214 OFFICE O/P EST MOD 30 MIN: CPT | Mod: 25

## 2021-07-08 RX ORDER — DOXYCYCLINE HYCLATE 100 MG/1
100 CAPSULE ORAL
Qty: 10 | Refills: 0 | Status: COMPLETED | COMMUNITY
Start: 2021-06-11 | End: 2021-07-08

## 2021-07-08 RX ORDER — DOXYCYCLINE HYCLATE 100 MG/1
100 TABLET ORAL
Qty: 4 | Refills: 0 | Status: COMPLETED | COMMUNITY
Start: 2021-06-08 | End: 2021-07-08

## 2021-07-08 RX ORDER — METHYLPRED ACET/NACL,ISO-OS/PF 40 MG/ML
40 VIAL (ML) INJECTION
Qty: 1 | Refills: 0 | Status: COMPLETED | OUTPATIENT
Start: 2021-07-08

## 2021-07-08 RX ADMIN — METHYLPREDNISOLONE ACETATE 40 MG/ML: 40 INJECTION, SUSPENSION INTRA-ARTICULAR; INTRALESIONAL; INTRAMUSCULAR; SOFT TISSUE at 00:00

## 2021-07-08 NOTE — HISTORY OF PRESENT ILLNESS
[Hyperlipidemia] : Hyperlipidemia [Hypertension] : Hypertension [Checks BP Sporadically] : The patient checks ~his/her~ blood pressure sporadically [<140/90] : Target blood pressure is <140/90 [Doing Well] : Patient is doing well [Managed with medications] : managed with  medication [Low Intensity Therapy] : Patient is currently on low intensity statin  therapy [FreeTextEntry6] : pt  developed rash on face  and arms after doing yard work

## 2021-07-08 NOTE — PHYSICAL EXAM
[PERRL] : pupils equal round and reactive to light [Normal Oropharynx] : the oropharynx was normal [Supple] : supple [Clear to Auscultation] : lungs were clear to auscultation bilaterally [Regular Rhythm] : with a regular rhythm [No Murmur] : no murmur heard [No Edema] : there was no peripheral edema [Soft] : abdomen soft [Non Tender] : non-tender [No HSM] : no HSM [Normal Supraclavicular Nodes] : no supraclavicular lymphadenopathy [Normal Posterior Cervical Nodes] : no posterior cervical lymphadenopathy [Normal Anterior Cervical Nodes] : no anterior cervical lymphadenopathy [No CVA Tenderness] : no CVA  tenderness [No Spinal Tenderness] : no spinal tenderness [Grossly Normal Strength/Tone] : grossly normal strength/tone [No Skin Lesions] : no skin lesions [Normal Insight/Judgement] : insight and judgment were intact [de-identified] : vesicular rash arms and wrist and  face and swelling left infraorbtal area

## 2021-09-29 LAB
ALBUMIN SERPL ELPH-MCNC: 4.1 G/DL
ALP BLD-CCNC: 82 U/L
ALT SERPL-CCNC: <5 U/L
ANION GAP SERPL CALC-SCNC: 12 MMOL/L
APPEARANCE: CLEAR
AST SERPL-CCNC: 14 U/L
BACTERIA: NEGATIVE
BASOPHILS # BLD AUTO: 0.07 K/UL
BASOPHILS NFR BLD AUTO: 1.1 %
BILIRUB SERPL-MCNC: 0.3 MG/DL
BILIRUBIN URINE: NEGATIVE
BLOOD URINE: NEGATIVE
BUN SERPL-MCNC: 22 MG/DL
CALCIUM SERPL-MCNC: 9 MG/DL
CHLORIDE SERPL-SCNC: 109 MMOL/L
CHOLEST SERPL-MCNC: 161 MG/DL
CO2 SERPL-SCNC: 22 MMOL/L
COLOR: NORMAL
COVID-19 NUCLEOCAPSID  GAM ANTIBODY INTERPRETATION: POSITIVE
COVID-19 SPIKE DOMAIN ANTIBODY INTERPRETATION: POSITIVE
CREAT SERPL-MCNC: 0.99 MG/DL
EOSINOPHIL # BLD AUTO: 0.14 K/UL
EOSINOPHIL NFR BLD AUTO: 2.2 %
GLUCOSE QUALITATIVE U: NEGATIVE
GLUCOSE SERPL-MCNC: 90 MG/DL
HCT VFR BLD CALC: 42.3 %
HDLC SERPL-MCNC: 29 MG/DL
HGB BLD-MCNC: 13.9 G/DL
HYALINE CASTS: 1 /LPF
IMM GRANULOCYTES NFR BLD AUTO: 0.5 %
KETONES URINE: NEGATIVE
LDLC SERPL CALC-MCNC: 60 MG/DL
LEUKOCYTE ESTERASE URINE: NEGATIVE
LYMPHOCYTES # BLD AUTO: 1.47 K/UL
LYMPHOCYTES NFR BLD AUTO: 23.3 %
MAN DIFF?: NORMAL
MCHC RBC-ENTMCNC: 31 PG
MCHC RBC-ENTMCNC: 32.9 GM/DL
MCV RBC AUTO: 94.4 FL
MICROSCOPIC-UA: NORMAL
MONOCYTES # BLD AUTO: 0.77 K/UL
MONOCYTES NFR BLD AUTO: 12.2 %
NEUTROPHILS # BLD AUTO: 3.82 K/UL
NEUTROPHILS NFR BLD AUTO: 60.7 %
NITRITE URINE: NEGATIVE
NONHDLC SERPL-MCNC: 133 MG/DL
PH URINE: 5
PLATELET # BLD AUTO: 134 K/UL
POTASSIUM SERPL-SCNC: 3.9 MMOL/L
PROT SERPL-MCNC: 6.7 G/DL
PROTEIN URINE: NEGATIVE
PSA SERPL-MCNC: 0.78 NG/ML
RBC # BLD: 4.48 M/UL
RBC # FLD: 13.7 %
RED BLOOD CELLS URINE: 0 /HPF
SARS-COV-2 AB SERPL IA-ACNC: >250 U/ML
SARS-COV-2 AB SERPL QL IA: 58.3 INDEX
SODIUM SERPL-SCNC: 143 MMOL/L
SPECIFIC GRAVITY URINE: 1.02
SQUAMOUS EPITHELIAL CELLS: 0 /HPF
T4 SERPL-MCNC: 7.8 UG/DL
TRIGL SERPL-MCNC: 363 MG/DL
TSH SERPL-ACNC: 1.15 UIU/ML
URATE SERPL-MCNC: 5.7 MG/DL
UROBILINOGEN URINE: NORMAL
WBC # FLD AUTO: 6.3 K/UL
WHITE BLOOD CELLS URINE: 0 /HPF

## 2021-11-02 ENCOUNTER — APPOINTMENT (OUTPATIENT)
Dept: FAMILY MEDICINE | Facility: CLINIC | Age: 71
End: 2021-11-02
Payer: MEDICARE

## 2021-11-02 VITALS
SYSTOLIC BLOOD PRESSURE: 130 MMHG | HEART RATE: 62 BPM | RESPIRATION RATE: 16 BRPM | DIASTOLIC BLOOD PRESSURE: 74 MMHG | TEMPERATURE: 96.7 F | BODY MASS INDEX: 31.83 KG/M2 | WEIGHT: 210 LBS | HEIGHT: 68 IN | OXYGEN SATURATION: 98 %

## 2021-11-02 VITALS — HEART RATE: 72 BPM | RESPIRATION RATE: 16 BRPM | SYSTOLIC BLOOD PRESSURE: 120 MMHG | DIASTOLIC BLOOD PRESSURE: 80 MMHG

## 2021-11-02 PROCEDURE — 90662 IIV NO PRSV INCREASED AG IM: CPT

## 2021-11-02 PROCEDURE — 99213 OFFICE O/P EST LOW 20 MIN: CPT | Mod: 25

## 2021-11-02 PROCEDURE — G0008: CPT

## 2021-11-02 RX ORDER — PREDNISONE 20 MG/1
20 TABLET ORAL DAILY
Qty: 12 | Refills: 0 | Status: COMPLETED | COMMUNITY
Start: 2021-07-08 | End: 2021-11-02

## 2021-11-02 NOTE — PHYSICAL EXAM
[No Acute Distress] : no acute distress [PERRL] : pupils equal round and reactive to light [Normal Oropharynx] : the oropharynx was normal [Supple] : supple [Clear to Auscultation] : lungs were clear to auscultation bilaterally [Regular Rhythm] : with a regular rhythm [No Murmur] : no murmur heard [No Edema] : there was no peripheral edema [Normal] : soft, non-tender, non-distended, no masses palpated, no HSM and normal bowel sounds [Normal Supraclavicular Nodes] : no supraclavicular lymphadenopathy [Normal Posterior Cervical Nodes] : no posterior cervical lymphadenopathy [Normal Anterior Cervical Nodes] : no anterior cervical lymphadenopathy [No CVA Tenderness] : no CVA  tenderness [No Joint Swelling] : no joint swelling [No Rash] : no rash [No Focal Deficits] : no focal deficits [Normal Insight/Judgement] : insight and judgment were intact

## 2021-11-02 NOTE — HISTORY OF PRESENT ILLNESS
[Hyperlipidemia] : Hyperlipidemia [Hypertension] : Hypertension [Checks BP Sporadically] : The patient checks ~his/her~ blood pressure sporadically [<140/90] : Target blood pressure is <140/90 [Doing Well] : Patient is doing well [Managed with medications] : managed with  medication [Low Intensity Therapy] : Patient is currently on low intensity statin  therapy [FreeTextEntry6] : p[t here for management of htn hld vion in os bad

## 2022-01-04 ENCOUNTER — APPOINTMENT (OUTPATIENT)
Dept: FAMILY MEDICINE | Facility: CLINIC | Age: 72
End: 2022-01-04
Payer: MEDICARE

## 2022-01-04 VITALS
HEIGHT: 68 IN | WEIGHT: 215 LBS | BODY MASS INDEX: 32.58 KG/M2 | RESPIRATION RATE: 16 BRPM | DIASTOLIC BLOOD PRESSURE: 64 MMHG | HEART RATE: 61 BPM | TEMPERATURE: 97.3 F | SYSTOLIC BLOOD PRESSURE: 124 MMHG | OXYGEN SATURATION: 98 %

## 2022-01-04 VITALS — RESPIRATION RATE: 16 BRPM | DIASTOLIC BLOOD PRESSURE: 70 MMHG | HEART RATE: 72 BPM | SYSTOLIC BLOOD PRESSURE: 120 MMHG

## 2022-01-04 DIAGNOSIS — M48.061 SPINAL STENOSIS, LUMBAR REGION WITHOUT NEUROGENIC CLAUDICATION: ICD-10-CM

## 2022-01-04 PROCEDURE — 99214 OFFICE O/P EST MOD 30 MIN: CPT

## 2022-01-04 NOTE — ASSESSMENT
[FreeTextEntry1] : sponal stenosis  with weaknees  LE  START PREDNISONE AND MRI OF LSS BP AT GOAL CONTINUE AMLODIPINE AND ATENOLOL AND HCTZ  HLD  CONTINUE LIPITOR CHECK BMP

## 2022-01-04 NOTE — PHYSICAL EXAM
[No Acute Distress] : no acute distress [PERRL] : pupils equal round and reactive to light [Normal TMs] : both tympanic membranes were normal [Supple] : supple [Clear to Auscultation] : lungs were clear to auscultation bilaterally [Regular Rhythm] : with a regular rhythm [No Murmur] : no murmur heard [No Spinal Tenderness] : no spinal tenderness [No Rash] : no rash [Normal Gait] : normal gait [Normal Affect] : the affect was normal [de-identified] : decrease rom  muscle  strength depressed

## 2022-01-04 NOTE — HISTORY OF PRESENT ILLNESS
[Hyperlipidemia] : Hyperlipidemia [Hypertension] : Hypertension [FreeTextEntry6] : p[t here for management of htn hld  pt has  bilateral leg  weakness and trouble  walking and going up stairs started  PT also ding home exercises  [Checks BP Sporadically] : The patient checks ~his/her~ blood pressure sporadically [<140/90] : Target blood pressure is <140/90 [Doing Well] : Patient is doing well [Managed with medications] : managed with  medication [Low Intensity Therapy] : Patient is currently on low intensity statin  therapy

## 2022-01-05 LAB
ANION GAP SERPL CALC-SCNC: 11 MMOL/L
BUN SERPL-MCNC: 20 MG/DL
CALCIUM SERPL-MCNC: 9.5 MG/DL
CHLORIDE SERPL-SCNC: 108 MMOL/L
CO2 SERPL-SCNC: 23 MMOL/L
CREAT SERPL-MCNC: 1.14 MG/DL
GLUCOSE SERPL-MCNC: 137 MG/DL
POTASSIUM SERPL-SCNC: 4.3 MMOL/L
SODIUM SERPL-SCNC: 143 MMOL/L

## 2022-01-08 ENCOUNTER — APPOINTMENT (OUTPATIENT)
Dept: MRI IMAGING | Facility: CLINIC | Age: 72
End: 2022-01-08
Payer: MEDICARE

## 2022-01-08 ENCOUNTER — OUTPATIENT (OUTPATIENT)
Dept: OUTPATIENT SERVICES | Facility: HOSPITAL | Age: 72
LOS: 1 days | End: 2022-01-08
Payer: MEDICARE

## 2022-01-08 DIAGNOSIS — M48.061 SPINAL STENOSIS, LUMBAR REGION WITHOUT NEUROGENIC CLAUDICATION: ICD-10-CM

## 2022-01-08 DIAGNOSIS — Z41.9 ENCOUNTER FOR PROCEDURE FOR PURPOSES OTHER THAN REMEDYING HEALTH STATE, UNSPECIFIED: Chronic | ICD-10-CM

## 2022-01-08 DIAGNOSIS — Z96.659 PRESENCE OF UNSPECIFIED ARTIFICIAL KNEE JOINT: Chronic | ICD-10-CM

## 2022-01-08 DIAGNOSIS — Z98.890 OTHER SPECIFIED POSTPROCEDURAL STATES: Chronic | ICD-10-CM

## 2022-01-08 DIAGNOSIS — Z98.49 CATARACT EXTRACTION STATUS, UNSPECIFIED EYE: Chronic | ICD-10-CM

## 2022-01-08 PROCEDURE — G1004: CPT

## 2022-01-08 PROCEDURE — 72148 MRI LUMBAR SPINE W/O DYE: CPT | Mod: ME

## 2022-01-08 PROCEDURE — 72148 MRI LUMBAR SPINE W/O DYE: CPT | Mod: 26,ME

## 2022-04-12 ENCOUNTER — APPOINTMENT (OUTPATIENT)
Dept: FAMILY MEDICINE | Facility: CLINIC | Age: 72
End: 2022-04-12
Payer: MEDICARE

## 2022-04-12 ENCOUNTER — NON-APPOINTMENT (OUTPATIENT)
Age: 72
End: 2022-04-12

## 2022-04-12 VITALS — SYSTOLIC BLOOD PRESSURE: 126 MMHG | HEART RATE: 60 BPM | DIASTOLIC BLOOD PRESSURE: 80 MMHG | RESPIRATION RATE: 16 BRPM

## 2022-04-12 VITALS
TEMPERATURE: 97.7 F | HEIGHT: 68 IN | RESPIRATION RATE: 16 BRPM | HEART RATE: 58 BPM | BODY MASS INDEX: 32.58 KG/M2 | OXYGEN SATURATION: 98 % | SYSTOLIC BLOOD PRESSURE: 110 MMHG | DIASTOLIC BLOOD PRESSURE: 74 MMHG | WEIGHT: 215 LBS

## 2022-04-12 PROCEDURE — 93000 ELECTROCARDIOGRAM COMPLETE: CPT | Mod: 59

## 2022-04-12 PROCEDURE — G0444 DEPRESSION SCREEN ANNUAL: CPT | Mod: 59

## 2022-04-12 PROCEDURE — 99497 ADVNCD CARE PLAN 30 MIN: CPT

## 2022-04-12 PROCEDURE — G0439: CPT

## 2022-04-12 PROCEDURE — 99213 OFFICE O/P EST LOW 20 MIN: CPT | Mod: 25

## 2022-04-12 RX ORDER — PREDNISONE 20 MG/1
20 TABLET ORAL DAILY
Qty: 12 | Refills: 0 | Status: COMPLETED | COMMUNITY
Start: 2022-01-04 | End: 2022-04-12

## 2022-04-12 NOTE — PHYSICAL EXAM
[No Acute Distress] : no acute distress [PERRL] : pupils equal round and reactive to light [Normal TMs] : both tympanic membranes were normal [Supple] : supple [Clear to Auscultation] : lungs were clear to auscultation bilaterally [Normal Rate] : normal rate  [Regular Rhythm] : with a regular rhythm [No Edema] : there was no peripheral edema [Normal] : soft, non-tender, non-distended, no masses palpated, no HSM and normal bowel sounds [Normal Sphincter Tone] : normal sphincter tone [No Mass] : no mass [Urethral Meatus] : meatus normal [Urinary Bladder Findings] : the bladder was normal on palpation [Scrotum] : the scrotum was normal [Testes Mass (___cm)] : there were no testicular masses [No Prostate Nodules] : no prostate nodules [Normal Supraclavicular Nodes] : no supraclavicular lymphadenopathy [Normal Anterior Cervical Nodes] : no anterior cervical lymphadenopathy [No CVA Tenderness] : no CVA  tenderness [No Joint Swelling] : no joint swelling [No Focal Deficits] : no focal deficits [Normal Affect] : the affect was normal

## 2022-04-13 LAB
ALBUMIN SERPL ELPH-MCNC: 4.3 G/DL
ALP BLD-CCNC: 73 U/L
ALT SERPL-CCNC: 5 U/L
ANION GAP SERPL CALC-SCNC: 10 MMOL/L
APPEARANCE: CLEAR
AST SERPL-CCNC: 17 U/L
BACTERIA: NEGATIVE
BASOPHILS # BLD AUTO: 0.06 K/UL
BASOPHILS NFR BLD AUTO: 0.9 %
BILIRUB SERPL-MCNC: 0.6 MG/DL
BILIRUBIN URINE: NEGATIVE
BLOOD URINE: NEGATIVE
BUN SERPL-MCNC: 17 MG/DL
CALCIUM SERPL-MCNC: 9.4 MG/DL
CHLORIDE SERPL-SCNC: 110 MMOL/L
CHOLEST SERPL-MCNC: 160 MG/DL
CO2 SERPL-SCNC: 24 MMOL/L
COLOR: YELLOW
CREAT SERPL-MCNC: 1.01 MG/DL
CREAT SPEC-SCNC: 144 MG/DL
EGFR: 80 ML/MIN/1.73M2
EOSINOPHIL # BLD AUTO: 0.15 K/UL
EOSINOPHIL NFR BLD AUTO: 2.4 %
ESTIMATED AVERAGE GLUCOSE: 103 MG/DL
GLUCOSE QUALITATIVE U: NEGATIVE
GLUCOSE SERPL-MCNC: 100 MG/DL
HBA1C MFR BLD HPLC: 5.2 %
HCT VFR BLD CALC: 44.3 %
HDLC SERPL-MCNC: 35 MG/DL
HGB BLD-MCNC: 14.2 G/DL
HYALINE CASTS: 0 /LPF
IMM GRANULOCYTES NFR BLD AUTO: 0.2 %
KETONES URINE: NEGATIVE
LDLC SERPL CALC-MCNC: 93 MG/DL
LEUKOCYTE ESTERASE URINE: NEGATIVE
LYMPHOCYTES # BLD AUTO: 1.61 K/UL
LYMPHOCYTES NFR BLD AUTO: 25.2 %
MAN DIFF?: NORMAL
MCHC RBC-ENTMCNC: 30.7 PG
MCHC RBC-ENTMCNC: 32.1 GM/DL
MCV RBC AUTO: 95.9 FL
MICROALBUMIN 24H UR DL<=1MG/L-MCNC: 1.8 MG/DL
MICROALBUMIN/CREAT 24H UR-RTO: 12 MG/G
MICROSCOPIC-UA: NORMAL
MONOCYTES # BLD AUTO: 0.67 K/UL
MONOCYTES NFR BLD AUTO: 10.5 %
NEUTROPHILS # BLD AUTO: 3.88 K/UL
NEUTROPHILS NFR BLD AUTO: 60.8 %
NITRITE URINE: NEGATIVE
NONHDLC SERPL-MCNC: 125 MG/DL
PH URINE: 7.5
PLATELET # BLD AUTO: 155 K/UL
POTASSIUM SERPL-SCNC: 4.6 MMOL/L
PROT SERPL-MCNC: 6.6 G/DL
PROTEIN URINE: NORMAL
PSA SERPL-MCNC: 0.73 NG/ML
RBC # BLD: 4.62 M/UL
RBC # FLD: 13.6 %
RED BLOOD CELLS URINE: 1 /HPF
SODIUM SERPL-SCNC: 144 MMOL/L
SPECIFIC GRAVITY URINE: 1.02
SQUAMOUS EPITHELIAL CELLS: 0 /HPF
T4 SERPL-MCNC: 8.9 UG/DL
TRIGL SERPL-MCNC: 160 MG/DL
TSH SERPL-ACNC: 0.96 UIU/ML
URATE SERPL-MCNC: 6.1 MG/DL
UROBILINOGEN URINE: NORMAL
WBC # FLD AUTO: 6.38 K/UL
WHITE BLOOD CELLS URINE: 0 /HPF

## 2022-04-24 ENCOUNTER — TRANSCRIPTION ENCOUNTER (OUTPATIENT)
Age: 72
End: 2022-04-24

## 2022-04-25 ENCOUNTER — TRANSCRIPTION ENCOUNTER (OUTPATIENT)
Age: 72
End: 2022-04-25

## 2022-05-12 NOTE — ASSESSMENT
[FreeTextEntry1] : HLD CONTINUE ATORVASTATIN HLD CONTINUE  HCTZ  AMLODIPINE AND ATENOLOL lab evaluation\par

## 2022-05-12 NOTE — HEALTH RISK ASSESSMENT
[Very Good] : ~his/her~  mood as very good [Never] : Never [Yes] : Yes [Monthly or less (1 pt)] : Monthly or less (1 point) [0] : 2) Feeling down, depressed, or hopeless: Not at all (0) [PHQ-2 Negative - No further assessment needed] : PHQ-2 Negative - No further assessment needed [Patient reported colonoscopy was normal] : Patient reported colonoscopy was normal [None] : None [With Family] : lives with family [Retired] : retired [College] : College [] :  [# Of Children ___] : has [unfilled] children [Sexually Active] : sexually active [Reports changes in vision] : Reports changes in vision [Smoke Detector] : smoke detector [Carbon Monoxide Detector] : carbon monoxide detector [Seat Belt] :  uses seat belt [de-identified] : NO  [[id number]] : 0 [Reports changes in hearing] : Reports no changes in hearing [Reports changes in dental health] : Reports no changes in dental health [ColonoscopyDate] : 12/18 [FreeTextEntry2] :   [de-identified] : GLAUCOMA RETINAL DETACHMENT  [AdvancecareDate] : 4/22  [FreeTextEntry4] : 16 minutes  spent discussing  end of life care and options for treatment such as, a DNR, DNI, dialysis, tube feeds and if patient becomes unable to make decisions for self and has incurable disease that treatment outweighs benefits.\par

## 2022-05-12 NOTE — HISTORY OF PRESENT ILLNESS
[FreeTextEntry1] : pt here for cpe and management of HTN HLD  [de-identified] : PT  HERE FOR MANAGEMENT OF HTN AND HLD

## 2022-05-19 DIAGNOSIS — R06.6 HICCOUGH: ICD-10-CM

## 2022-06-24 ENCOUNTER — APPOINTMENT (OUTPATIENT)
Dept: FAMILY MEDICINE | Facility: CLINIC | Age: 72
End: 2022-06-24

## 2022-06-24 VITALS
HEIGHT: 68 IN | RESPIRATION RATE: 16 BRPM | BODY MASS INDEX: 32.58 KG/M2 | TEMPERATURE: 97.2 F | WEIGHT: 215 LBS | HEART RATE: 59 BPM | OXYGEN SATURATION: 98 % | SYSTOLIC BLOOD PRESSURE: 110 MMHG | DIASTOLIC BLOOD PRESSURE: 80 MMHG
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
## 2022-06-24 PROCEDURE — 99213 OFFICE O/P EST LOW 20 MIN: CPT

## 2022-06-24 NOTE — ASSESSMENT
[FreeTextEntry1] : bug bite w/ blister - blister drained serous fluid, dressed with DSD and bacitracin. Advised pt not to scratch at area and to keep it covered with bandage until resolution

## 2022-06-24 NOTE — HISTORY OF PRESENT ILLNESS
[FreeTextEntry8] : Pt c/o bug bite on R medial ankle first noticed yesterday. Area around bite has become more itchy and swollen.

## 2022-06-24 NOTE — PHYSICAL EXAM
[Normal] : no acute distress, well nourished, well developed and well-appearing [de-identified] : R medial ankle blister draining serous fluid

## 2022-10-16 ENCOUNTER — RX RENEWAL (OUTPATIENT)
Age: 72
End: 2022-10-16

## 2022-10-19 RX ORDER — BRIMONIDINE TARTRATE, TIMOLOL MALEATE 2; 5 MG/ML; MG/ML
0.2-0.5 SOLUTION/ DROPS TOPICAL
Qty: 15 | Refills: 0 | Status: ACTIVE | COMMUNITY
Start: 2022-10-16 | End: 1900-01-01

## 2023-03-13 ENCOUNTER — APPOINTMENT (OUTPATIENT)
Dept: FAMILY MEDICINE | Facility: CLINIC | Age: 73
End: 2023-03-13
Payer: MEDICARE

## 2023-03-13 ENCOUNTER — RESULT REVIEW (OUTPATIENT)
Age: 73
End: 2023-03-13

## 2023-03-13 VITALS
BODY MASS INDEX: 33.95 KG/M2 | OXYGEN SATURATION: 98 % | DIASTOLIC BLOOD PRESSURE: 72 MMHG | TEMPERATURE: 97.2 F | WEIGHT: 224 LBS | HEIGHT: 68 IN | SYSTOLIC BLOOD PRESSURE: 129 MMHG | HEART RATE: 82 BPM

## 2023-03-13 VITALS — RESPIRATION RATE: 16 BRPM | HEART RATE: 72 BPM | DIASTOLIC BLOOD PRESSURE: 80 MMHG | SYSTOLIC BLOOD PRESSURE: 150 MMHG

## 2023-03-13 DIAGNOSIS — M17.9 OSTEOARTHRITIS OF KNEE, UNSPECIFIED: ICD-10-CM

## 2023-03-13 PROCEDURE — 99214 OFFICE O/P EST MOD 30 MIN: CPT

## 2023-03-13 RX ORDER — CHLORPROMAZINE HYDROCHLORIDE 25 MG/1
25 TABLET, SUGAR COATED ORAL 3 TIMES DAILY
Qty: 21 | Refills: 0 | Status: COMPLETED | COMMUNITY
Start: 2022-05-19 | End: 2023-03-13

## 2023-03-13 RX ORDER — BETAMETHASONE DIPROPIONATE 0.5 MG/G
0.05 CREAM, AUGMENTED TOPICAL TWICE DAILY
Qty: 1 | Refills: 0 | Status: COMPLETED | COMMUNITY
Start: 2017-06-06 | End: 2023-03-13

## 2023-03-13 RX ORDER — METHAZOLAMIDE 25 MG/1
25 TABLET ORAL TWICE DAILY
Qty: 360 | Refills: 1 | Status: COMPLETED | COMMUNITY
Start: 2019-09-05 | End: 2023-03-13

## 2023-03-13 NOTE — HISTORY OF PRESENT ILLNESS
[Hyperlipidemia] : Hyperlipidemia [Hypertension] : Hypertension [Checks BP Regularly] : The patient checks ~his/her~ blood pressure regularly [<130/90] : Target blood pressure is  <130/90 [Target goal met] : BP target goal met [Doing Well] : Patient is doing well [Managed with medications] : managed with  medication [Moderate Intensity Therapy] : Patient is currently on moderate intensity statin  therapy [FreeTextEntry6] : p[t here for management of htn hld pt was in floirda saw MD and had  med  change pt c/o  bilateral knee pain

## 2023-03-13 NOTE — PHYSICAL EXAM
[No Acute Distress] : no acute distress [PERRL] : pupils equal round and reactive to light [Normal TMs] : both tympanic membranes were normal [Supple] : supple [Clear to Auscultation] : lungs were clear to auscultation bilaterally [Normal Rate] : normal rate  [Regular Rhythm] : with a regular rhythm [No Edema] : there was no peripheral edema [Normal] : soft, non-tender, non-distended, no masses palpated, no HSM and normal bowel sounds [Normal Sphincter Tone] : normal sphincter tone [No Mass] : no mass [Urethral Meatus] : meatus normal [Urinary Bladder Findings] : the bladder was normal on palpation [Scrotum] : the scrotum was normal [Testes Mass (___cm)] : there were no testicular masses [No Prostate Nodules] : no prostate nodules [No CVA Tenderness] : no CVA  tenderness [No Joint Swelling] : no joint swelling [No Focal Deficits] : no focal deficits [Normal Affect] : the affect was normal

## 2023-03-17 ENCOUNTER — OUTPATIENT (OUTPATIENT)
Dept: OUTPATIENT SERVICES | Facility: HOSPITAL | Age: 73
LOS: 1 days | End: 2023-03-17
Payer: MEDICARE

## 2023-03-17 ENCOUNTER — APPOINTMENT (OUTPATIENT)
Dept: RADIOLOGY | Facility: CLINIC | Age: 73
End: 2023-03-17
Payer: MEDICARE

## 2023-03-17 DIAGNOSIS — Z96.659 PRESENCE OF UNSPECIFIED ARTIFICIAL KNEE JOINT: Chronic | ICD-10-CM

## 2023-03-17 DIAGNOSIS — Z98.890 OTHER SPECIFIED POSTPROCEDURAL STATES: Chronic | ICD-10-CM

## 2023-03-17 DIAGNOSIS — M17.9 OSTEOARTHRITIS OF KNEE, UNSPECIFIED: ICD-10-CM

## 2023-03-17 DIAGNOSIS — Z41.9 ENCOUNTER FOR PROCEDURE FOR PURPOSES OTHER THAN REMEDYING HEALTH STATE, UNSPECIFIED: Chronic | ICD-10-CM

## 2023-03-17 DIAGNOSIS — Z98.49 CATARACT EXTRACTION STATUS, UNSPECIFIED EYE: Chronic | ICD-10-CM

## 2023-03-17 PROCEDURE — 73562 X-RAY EXAM OF KNEE 3: CPT

## 2023-03-17 PROCEDURE — 73562 X-RAY EXAM OF KNEE 3: CPT | Mod: 26,50

## 2023-04-17 ENCOUNTER — APPOINTMENT (OUTPATIENT)
Dept: FAMILY MEDICINE | Facility: CLINIC | Age: 73
End: 2023-04-17
Payer: MEDICARE

## 2023-04-17 VITALS
DIASTOLIC BLOOD PRESSURE: 61 MMHG | BODY MASS INDEX: 33.95 KG/M2 | WEIGHT: 224 LBS | HEIGHT: 68 IN | HEART RATE: 75 BPM | SYSTOLIC BLOOD PRESSURE: 118 MMHG | OXYGEN SATURATION: 98 % | TEMPERATURE: 97.3 F

## 2023-04-17 VITALS — DIASTOLIC BLOOD PRESSURE: 86 MMHG | RESPIRATION RATE: 16 BRPM | HEART RATE: 72 BPM | SYSTOLIC BLOOD PRESSURE: 140 MMHG

## 2023-04-17 DIAGNOSIS — R19.8 OTHER SPECIFIED SYMPTOMS AND SIGNS INVOLVING THE DIGESTIVE SYSTEM AND ABDOMEN: ICD-10-CM

## 2023-04-17 PROCEDURE — 99214 OFFICE O/P EST MOD 30 MIN: CPT

## 2023-04-17 NOTE — ASSESSMENT
[FreeTextEntry1] : bp acceptable  continue olmesartanamlodipine and hctz  hld continue atorvastatin bowl movement blood  x1 stool blood

## 2023-04-18 LAB
ALBUMIN SERPL ELPH-MCNC: 4.2 G/DL
ALP BLD-CCNC: 78 U/L
ALT SERPL-CCNC: 10 U/L
ANION GAP SERPL CALC-SCNC: 12 MMOL/L
APPEARANCE: ABNORMAL
AST SERPL-CCNC: 20 U/L
BACTERIA: NEGATIVE /HPF
BASOPHILS # BLD AUTO: 0.07 K/UL
BASOPHILS NFR BLD AUTO: 1.2 %
BILIRUB SERPL-MCNC: 0.6 MG/DL
BILIRUBIN URINE: NEGATIVE
BLOOD URINE: NEGATIVE
BUN SERPL-MCNC: 20 MG/DL
CALCIUM SERPL-MCNC: 9.9 MG/DL
CAST: 2 /LPF
CHLORIDE SERPL-SCNC: 107 MMOL/L
CHOLEST SERPL-MCNC: 162 MG/DL
CO2 SERPL-SCNC: 26 MMOL/L
COLOR: YELLOW
CREAT SERPL-MCNC: 1.08 MG/DL
CREAT SPEC-SCNC: 203 MG/DL
EGFR: 73 ML/MIN/1.73M2
EOSINOPHIL # BLD AUTO: 0.15 K/UL
EOSINOPHIL NFR BLD AUTO: 2.6 %
EPITHELIAL CELLS: 1 /HPF
ESTIMATED AVERAGE GLUCOSE: 103 MG/DL
GLUCOSE QUALITATIVE U: NEGATIVE
GLUCOSE SERPL-MCNC: 108 MG/DL
HBA1C MFR BLD HPLC: 5.2 %
HCT VFR BLD CALC: 43.1 %
HDLC SERPL-MCNC: 34 MG/DL
HGB BLD-MCNC: 13.9 G/DL
IMM GRANULOCYTES NFR BLD AUTO: 0.4 %
KETONES URINE: NEGATIVE
LDLC SERPL CALC-MCNC: 91 MG/DL
LEUKOCYTE ESTERASE URINE: NEGATIVE
LYMPHOCYTES # BLD AUTO: 1.3 K/UL
LYMPHOCYTES NFR BLD AUTO: 22.8 %
MAN DIFF?: NORMAL
MCHC RBC-ENTMCNC: 31 PG
MCHC RBC-ENTMCNC: 32.3 GM/DL
MCV RBC AUTO: 96.2 FL
MICROALBUMIN 24H UR DL<=1MG/L-MCNC: 4.2 MG/DL
MICROALBUMIN/CREAT 24H UR-RTO: 21 MG/G
MICROSCOPIC-UA: NORMAL
MONOCYTES # BLD AUTO: 0.74 K/UL
MONOCYTES NFR BLD AUTO: 13 %
NEUTROPHILS # BLD AUTO: 3.41 K/UL
NEUTROPHILS NFR BLD AUTO: 60 %
NITRITE URINE: NEGATIVE
NONHDLC SERPL-MCNC: 128 MG/DL
PH URINE: 5.5
PLATELET # BLD AUTO: 153 K/UL
POTASSIUM SERPL-SCNC: 4.9 MMOL/L
PROT SERPL-MCNC: 6.9 G/DL
PROTEIN URINE: NORMAL
PSA SERPL-MCNC: 0.91 NG/ML
RBC # BLD: 4.48 M/UL
RBC # FLD: 14.4 %
RED BLOOD CELLS URINE: 0 /HPF
SODIUM SERPL-SCNC: 145 MMOL/L
SPECIFIC GRAVITY URINE: 1.02
T4 SERPL-MCNC: 6.2 UG/DL
TRIGL SERPL-MCNC: 184 MG/DL
TSH SERPL-ACNC: 1.38 UIU/ML
URATE SERPL-MCNC: 7 MG/DL
UROBILINOGEN URINE: 0.2
WBC # FLD AUTO: 5.69 K/UL
WHITE BLOOD CELLS URINE: 1 /HPF

## 2023-07-27 ENCOUNTER — APPOINTMENT (OUTPATIENT)
Dept: FAMILY MEDICINE | Facility: CLINIC | Age: 73
End: 2023-07-27
Payer: MEDICARE

## 2023-07-27 VITALS
OXYGEN SATURATION: 97 % | HEART RATE: 74 BPM | WEIGHT: 218 LBS | BODY MASS INDEX: 33.04 KG/M2 | HEIGHT: 68 IN | TEMPERATURE: 97.2 F | SYSTOLIC BLOOD PRESSURE: 134 MMHG | DIASTOLIC BLOOD PRESSURE: 80 MMHG
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
## 2023-07-27 DIAGNOSIS — R21 RASH AND OTHER NONSPECIFIC SKIN ERUPTION: ICD-10-CM

## 2023-07-27 PROCEDURE — 99213 OFFICE O/P EST LOW 20 MIN: CPT

## 2023-07-27 NOTE — HISTORY OF PRESENT ILLNESS
[FreeTextEntry8] : PT presenting for tick bite on right shin\par Had 2 ticks, tried to remove both, unsure if he successfully removed them both \par \par Doing well otherwise\par no symptoms

## 2023-07-27 NOTE — ASSESSMENT
[FreeTextEntry1] : given doxy x 1 day for ppx\par looks like all of tick was removed prior to arrival \par cont to monitor symptoms

## 2023-07-27 NOTE — PHYSICAL EXAM
[Normal] : no acute distress, well nourished, well developed and well-appearing [de-identified] : small irritation around tick bite

## 2023-08-31 ENCOUNTER — RX RENEWAL (OUTPATIENT)
Age: 73
End: 2023-08-31

## 2023-09-25 ENCOUNTER — APPOINTMENT (OUTPATIENT)
Dept: FAMILY MEDICINE | Facility: CLINIC | Age: 73
End: 2023-09-25
Payer: MEDICARE

## 2023-09-25 VITALS
DIASTOLIC BLOOD PRESSURE: 82 MMHG | WEIGHT: 217 LBS | HEART RATE: 100 BPM | BODY MASS INDEX: 32.89 KG/M2 | TEMPERATURE: 97.7 F | SYSTOLIC BLOOD PRESSURE: 136 MMHG | OXYGEN SATURATION: 98 % | HEIGHT: 68 IN

## 2023-09-25 DIAGNOSIS — J06.9 ACUTE UPPER RESPIRATORY INFECTION, UNSPECIFIED: ICD-10-CM

## 2023-09-25 PROCEDURE — 99213 OFFICE O/P EST LOW 20 MIN: CPT

## 2023-09-26 DIAGNOSIS — U07.1 COVID-19: ICD-10-CM

## 2023-09-26 LAB
INFLUENZA A RESULT: NOT DETECTED
INFLUENZA B RESULT: NOT DETECTED
RESP SYN VIRUS RESULT: NOT DETECTED
SARS-COV-2 RESULT: DETECTED

## 2023-10-09 ENCOUNTER — RX ONLY (RX ONLY)
Age: 73
End: 2023-10-09

## 2023-10-09 ENCOUNTER — OFFICE (OUTPATIENT)
Facility: LOCATION | Age: 73
Setting detail: OPHTHALMOLOGY
End: 2023-10-09
Payer: MEDICARE

## 2023-10-09 DIAGNOSIS — H18.513: ICD-10-CM

## 2023-10-09 DIAGNOSIS — H02.831: ICD-10-CM

## 2023-10-09 DIAGNOSIS — H35.373: ICD-10-CM

## 2023-10-09 DIAGNOSIS — H16.223: ICD-10-CM

## 2023-10-09 DIAGNOSIS — H02.834: ICD-10-CM

## 2023-10-09 DIAGNOSIS — H40.1111: ICD-10-CM

## 2023-10-09 DIAGNOSIS — H31.29: ICD-10-CM

## 2023-10-09 DIAGNOSIS — H40.1123: ICD-10-CM

## 2023-10-09 PROCEDURE — 92250 FUNDUS PHOTOGRAPHY W/I&R: CPT | Performed by: OPHTHALMOLOGY

## 2023-10-09 PROCEDURE — 92083 EXTENDED VISUAL FIELD XM: CPT | Performed by: OPHTHALMOLOGY

## 2023-10-09 PROCEDURE — 92283 EXTND COLOR VISION XM: CPT | Performed by: OPHTHALMOLOGY

## 2023-10-09 PROCEDURE — 99213 OFFICE O/P EST LOW 20 MIN: CPT | Performed by: OPHTHALMOLOGY

## 2023-10-09 ASSESSMENT — SUPERFICIAL PUNCTATE KERATITIS (SPK)
OS_SPK: T
OD_SPK: T

## 2023-10-09 ASSESSMENT — TONOMETRY
OS_IOP_MMHG: 11
OD_IOP_MMHG: 18

## 2023-10-09 ASSESSMENT — CORNEAL DYSTROPHY
OS_DYSTROPHY: ARCUS
OD_DYSTROPHY: ARCUS

## 2023-10-09 ASSESSMENT — CONFRONTATIONAL VISUAL FIELD TEST (CVF)
OS_FINDINGS: FULL
OD_FINDINGS: FULL

## 2023-10-09 ASSESSMENT — CORNEAL DYSTROPHY - POSTERIOR
OS_POSTERIORDYSTROPHY: GUTTATA
OD_POSTERIORDYSTROPHY: GUTTATA

## 2023-10-10 PROBLEM — H02.834 DERMATOCHALASIS; RIGHT UPPER LID, LEFT UPPER LID: Status: ACTIVE | Noted: 2023-10-09

## 2023-10-10 PROBLEM — Z96.1 PSEUDOPHAKIA ; BOTH EYES: Status: ACTIVE | Noted: 2023-10-09

## 2023-10-10 PROBLEM — H31.29 PERIPAPILLARY ATROPHY ; BOTH EYES: Status: ACTIVE | Noted: 2023-10-09

## 2023-10-10 PROBLEM — H02.831 DERMATOCHALASIS; RIGHT UPPER LID, LEFT UPPER LID: Status: ACTIVE | Noted: 2023-10-09

## 2023-10-10 ASSESSMENT — REFRACTION_CURRENTRX
OS_SPHERE: -1.00
OD_SPHERE: -1.50
OS_OVR_VA: 20/
OD_CYLINDER: +1.00
OD_AXIS: 033
OD_OVR_VA: 20/
OS_AXIS: 151
OS_CYLINDER: +0.75

## 2023-10-10 ASSESSMENT — VISUAL ACUITY
OS_BCVA: 20/30
OD_BCVA: 20/LP

## 2023-10-16 ENCOUNTER — APPOINTMENT (OUTPATIENT)
Dept: FAMILY MEDICINE | Facility: CLINIC | Age: 73
End: 2023-10-16
Payer: MEDICARE

## 2023-10-16 PROCEDURE — G0008: CPT

## 2023-10-16 PROCEDURE — 90662 IIV NO PRSV INCREASED AG IM: CPT

## 2023-10-17 ENCOUNTER — MED ADMIN CHARGE (OUTPATIENT)
Age: 73
End: 2023-10-17

## 2023-11-24 NOTE — PLAN
[FreeTextEntry1] : Will FU for CPE with Dr. Randall in September. Will need flu vaccine. \par Due to FU with [facility] in the fall but will not travel with COVID risk.
normal

## 2023-12-11 RX ORDER — AMLODIPINE BESYLATE 5 MG/1
5 TABLET ORAL
Qty: 90 | Refills: 2 | Status: COMPLETED | COMMUNITY
Start: 2019-01-07 | End: 2023-12-11

## 2023-12-11 RX ORDER — DOXYCYCLINE HYCLATE 100 MG/1
100 TABLET ORAL TWICE DAILY
Qty: 2 | Refills: 0 | Status: COMPLETED | COMMUNITY
Start: 2023-07-27 | End: 2023-12-11

## 2023-12-11 RX ORDER — ATORVASTATIN CALCIUM 10 MG/1
10 TABLET, FILM COATED ORAL
Qty: 90 | Refills: 2 | Status: COMPLETED | COMMUNITY
Start: 2023-08-31 | End: 2023-12-11

## 2023-12-11 RX ORDER — NIRMATRELVIR AND RITONAVIR 300-100 MG
20 X 150 MG & KIT ORAL TWICE DAILY
Qty: 1 | Refills: 0 | Status: COMPLETED | COMMUNITY
Start: 2023-09-26 | End: 2023-12-11

## 2023-12-22 ENCOUNTER — OFFICE (OUTPATIENT)
Facility: LOCATION | Age: 73
Setting detail: OPHTHALMOLOGY
End: 2023-12-22
Payer: MEDICARE

## 2023-12-22 DIAGNOSIS — H16.223: ICD-10-CM

## 2023-12-22 DIAGNOSIS — H31.29: ICD-10-CM

## 2023-12-22 DIAGNOSIS — H02.831: ICD-10-CM

## 2023-12-22 DIAGNOSIS — H02.834: ICD-10-CM

## 2023-12-22 DIAGNOSIS — H40.1111: ICD-10-CM

## 2023-12-22 DIAGNOSIS — H40.1123: ICD-10-CM

## 2023-12-22 DIAGNOSIS — H35.373: ICD-10-CM

## 2023-12-22 DIAGNOSIS — Z96.1: ICD-10-CM

## 2023-12-22 DIAGNOSIS — H18.513: ICD-10-CM

## 2023-12-22 PROCEDURE — 92134 CPTRZ OPH DX IMG PST SGM RTA: CPT | Performed by: OPHTHALMOLOGY

## 2023-12-22 PROCEDURE — 99213 OFFICE O/P EST LOW 20 MIN: CPT | Performed by: OPHTHALMOLOGY

## 2023-12-22 ASSESSMENT — CORNEAL DYSTROPHY - POSTERIOR
OS_POSTERIORDYSTROPHY: GUTTATA
OD_POSTERIORDYSTROPHY: GUTTATA

## 2023-12-22 ASSESSMENT — SUPERFICIAL PUNCTATE KERATITIS (SPK)
OD_SPK: T
OS_SPK: T

## 2023-12-22 ASSESSMENT — CORNEAL DYSTROPHY
OS_DYSTROPHY: ARCUS
OD_DYSTROPHY: ARCUS

## 2023-12-22 ASSESSMENT — CONFRONTATIONAL VISUAL FIELD TEST (CVF)
OS_FINDINGS: FULL
OD_FINDINGS: FULL

## 2023-12-26 ASSESSMENT — REFRACTION_AUTOREFRACTION
OD_SPHERE: -1.00
OD_CYLINDER: +1.00
OS_CYLINDER: +1.25
OD_AXIS: 029
OS_SPHERE: -2.50
OS_AXIS: 139

## 2023-12-26 ASSESSMENT — REFRACTION_CURRENTRX
OD_OVR_VA: 20/
OS_OVR_VA: 20/
OS_SPHERE: -1.00
OD_SPHERE: -1.50
OD_CYLINDER: +1.00
OS_AXIS: 151
OS_CYLINDER: +0.75
OD_AXIS: 033

## 2023-12-26 ASSESSMENT — SPHEQUIV_DERIVED
OS_SPHEQUIV: -1.875
OD_SPHEQUIV: -0.5

## 2024-02-28 ENCOUNTER — OFFICE (OUTPATIENT)
Facility: LOCATION | Age: 74
Setting detail: OPHTHALMOLOGY
End: 2024-02-28
Payer: MEDICARE

## 2024-02-28 DIAGNOSIS — H02.831: ICD-10-CM

## 2024-02-28 DIAGNOSIS — H18.513: ICD-10-CM

## 2024-02-28 DIAGNOSIS — H40.1111: ICD-10-CM

## 2024-02-28 DIAGNOSIS — Z96.1: ICD-10-CM

## 2024-02-28 DIAGNOSIS — H16.223: ICD-10-CM

## 2024-02-28 DIAGNOSIS — H40.1123: ICD-10-CM

## 2024-02-28 DIAGNOSIS — H31.29: ICD-10-CM

## 2024-02-28 DIAGNOSIS — H35.373: ICD-10-CM

## 2024-02-28 DIAGNOSIS — H02.834: ICD-10-CM

## 2024-02-28 PROCEDURE — 92014 COMPRE OPH EXAM EST PT 1/>: CPT | Performed by: OPHTHALMOLOGY

## 2024-02-28 PROCEDURE — 92250 FUNDUS PHOTOGRAPHY W/I&R: CPT | Performed by: OPHTHALMOLOGY

## 2024-02-28 ASSESSMENT — CONFRONTATIONAL VISUAL FIELD TEST (CVF)
OD_FINDINGS: FULL
OS_FINDINGS: CONSTRICTION

## 2024-02-28 ASSESSMENT — SUPERFICIAL PUNCTATE KERATITIS (SPK)
OS_SPK: T
OD_SPK: T

## 2024-02-28 ASSESSMENT — CORNEAL DYSTROPHY
OS_DYSTROPHY: ARCUS
OD_DYSTROPHY: ARCUS

## 2024-02-28 ASSESSMENT — CORNEAL DYSTROPHY - POSTERIOR
OD_POSTERIORDYSTROPHY: GUTTATA
OS_POSTERIORDYSTROPHY: GUTTATA

## 2024-02-29 ASSESSMENT — SPHEQUIV_DERIVED
OS_SPHEQUIV: -1.625
OD_SPHEQUIV: -0.75

## 2024-02-29 ASSESSMENT — REFRACTION_CURRENTRX
OS_SPHERE: -1.00
OS_CYLINDER: +0.75
OS_OVR_VA: 20/
OS_AXIS: 151
OD_OVR_VA: 20/
OD_CYLINDER: +1.00
OD_AXIS: 033
OD_SPHERE: -1.50

## 2024-02-29 ASSESSMENT — REFRACTION_AUTOREFRACTION
OS_SPHERE: -2.25
OD_CYLINDER: +1.00
OS_AXIS: 097
OD_AXIS: 014
OS_CYLINDER: +1.25
OD_SPHERE: -1.25

## 2024-05-14 ENCOUNTER — RX RENEWAL (OUTPATIENT)
Age: 74
End: 2024-05-14

## 2024-06-12 ENCOUNTER — APPOINTMENT (OUTPATIENT)
Dept: FAMILY MEDICINE | Facility: CLINIC | Age: 74
End: 2024-06-12
Payer: MEDICARE

## 2024-06-12 VITALS
DIASTOLIC BLOOD PRESSURE: 82 MMHG | BODY MASS INDEX: 34.56 KG/M2 | HEART RATE: 84 BPM | TEMPERATURE: 97.8 F | OXYGEN SATURATION: 98 % | WEIGHT: 228 LBS | HEIGHT: 68 IN | SYSTOLIC BLOOD PRESSURE: 130 MMHG

## 2024-06-12 VITALS — DIASTOLIC BLOOD PRESSURE: 90 MMHG | SYSTOLIC BLOOD PRESSURE: 160 MMHG | RESPIRATION RATE: 16 BRPM | HEART RATE: 76 BPM

## 2024-06-12 DIAGNOSIS — E78.5 HYPERLIPIDEMIA, UNSPECIFIED: ICD-10-CM

## 2024-06-12 DIAGNOSIS — C95.90 LEUKEMIA, UNSPECIFIED NOT HAVING ACHIEVED REMISSION: ICD-10-CM

## 2024-06-12 DIAGNOSIS — I10 ESSENTIAL (PRIMARY) HYPERTENSION: ICD-10-CM

## 2024-06-12 PROCEDURE — G0439: CPT

## 2024-06-12 PROCEDURE — 99497 ADVNCD CARE PLAN 30 MIN: CPT | Mod: 25

## 2024-06-12 PROCEDURE — 99214 OFFICE O/P EST MOD 30 MIN: CPT | Mod: 25

## 2024-06-12 RX ORDER — OLMESARTAN MEDOXOMIL / AMLODIPINE BESYLATE / HYDROCHLOROTHIAZIDE 40; 10; 12.5 MG/1; MG/1; MG/1
40-10-12.5 TABLET, FILM COATED ORAL
Qty: 90 | Refills: 1 | Status: ACTIVE | COMMUNITY
Start: 2023-03-13 | End: 1900-01-01

## 2024-06-12 NOTE — PHYSICAL EXAM
[No Acute Distress] : no acute distress [PERRL] : pupils equal round and reactive to light [Normal TMs] : both tympanic membranes were normal [Supple] : supple [Clear to Auscultation] : lungs were clear to auscultation bilaterally [Regular Rhythm] : with a regular rhythm [No Edema] : there was no peripheral edema [Normal] : soft, non-tender, non-distended, no masses palpated, no HSM and normal bowel sounds [Normal Supraclavicular Nodes] : no supraclavicular lymphadenopathy [Normal Posterior Cervical Nodes] : no posterior cervical lymphadenopathy [Normal Anterior Cervical Nodes] : no anterior cervical lymphadenopathy [No CVA Tenderness] : no CVA  tenderness [No Joint Swelling] : no joint swelling [No Rash] : no rash [No Focal Deficits] : no focal deficits [Normal Gait] : normal gait [Normal Insight/Judgement] : insight and judgment were intact

## 2024-06-12 NOTE — HEALTH RISK ASSESSMENT
[Very Good] : ~his/her~  mood as very good [Yes] : Yes [No falls in past year] : Patient reported no falls in the past year [0] : 2) Feeling down, depressed, or hopeless: Not at all (0) [PHQ-2 Negative - No further assessment needed] : PHQ-2 Negative - No further assessment needed [de-identified] : NO  [Never] : Never [Change in mental status noted] : No change in mental status noted [None] : None [With Family] : lives with family [Retired] : retired [College] : College [] :  [# Of Children ___] : has [unfilled] children [Reports changes in hearing] : Reports no changes in hearing [Reports changes in vision] : Reports changes in vision [Reports changes in dental health] : Reports no changes in dental health [Smoke Detector] : smoke detector [Carbon Monoxide Detector] : carbon monoxide detector [ColonoscopyDate] : 5/18 [de-identified] :  [de-identified] : LEFT EYE BLIND  [With Patient/Caregiver] : , with patient/caregiver [Name: ___] : Health Care Proxy's Name: [unfilled]  [Relationship: ___] : Relationship: [unfilled] [Time Spent: ___ minutes] : Time Spent: [unfilled] minutes [AdvancecareDate] : 6/24 [FreeTextEntry4] : 16 minutes  spent discussing  end of life care and options for treatment such as, a DNR, DNI, dialysis, tube feeds and if patient becomes unable to make decisions for self and has incurable disease that treatment would not benefit patient

## 2024-06-12 NOTE — ASSESSMENT
[FreeTextEntry1] : BP NOT AT GOAL INTREASE OLMESARTAN TO 40-10 -12.5 HLD CONTINUE ATORVASTATIN lab evaluation

## 2024-06-12 NOTE — HISTORY OF PRESENT ILLNESS
[FreeTextEntry1] : pt here for cpe and management of HLD AND HTN  [de-identified] : FEELING WELL  HAD  TKA 1/24 DOING WELL

## 2024-06-13 LAB
ALBUMIN SERPL ELPH-MCNC: 4.3 G/DL
ALP BLD-CCNC: 98 U/L
ALT SERPL-CCNC: 7 U/L
ANION GAP SERPL CALC-SCNC: 11 MMOL/L
APPEARANCE: CLEAR
AST SERPL-CCNC: 16 U/L
BACTERIA: NEGATIVE /HPF
BASOPHILS # BLD AUTO: 0.07 K/UL
BASOPHILS NFR BLD AUTO: 1 %
BILIRUB SERPL-MCNC: 0.4 MG/DL
BILIRUBIN URINE: NEGATIVE
BLOOD URINE: NEGATIVE
BUN SERPL-MCNC: 21 MG/DL
CALCIUM SERPL-MCNC: 9.3 MG/DL
CAST: 0 /LPF
CHLORIDE SERPL-SCNC: 110 MMOL/L
CHOLEST SERPL-MCNC: 154 MG/DL
CO2 SERPL-SCNC: 26 MMOL/L
COLOR: YELLOW
CREAT SERPL-MCNC: 1.06 MG/DL
CREAT SPEC-SCNC: 123 MG/DL
EGFR: 74 ML/MIN/1.73M2
EOSINOPHIL # BLD AUTO: 0.25 K/UL
EOSINOPHIL NFR BLD AUTO: 3.7 %
EPITHELIAL CELLS: 0 /HPF
ESTIMATED AVERAGE GLUCOSE: 108 MG/DL
GLUCOSE QUALITATIVE U: NEGATIVE MG/DL
GLUCOSE SERPL-MCNC: 83 MG/DL
HBA1C MFR BLD HPLC: 5.4 %
HCT VFR BLD CALC: 41.1 %
HDLC SERPL-MCNC: 37 MG/DL
HGB BLD-MCNC: 13.8 G/DL
IMM GRANULOCYTES NFR BLD AUTO: 0.3 %
KETONES URINE: NEGATIVE MG/DL
LDLC SERPL CALC-MCNC: 76 MG/DL
LEUKOCYTE ESTERASE URINE: NEGATIVE
LYMPHOCYTES # BLD AUTO: 1.37 K/UL
LYMPHOCYTES NFR BLD AUTO: 20.1 %
MAN DIFF?: NORMAL
MCHC RBC-ENTMCNC: 30.3 PG
MCHC RBC-ENTMCNC: 33.6 GM/DL
MCV RBC AUTO: 90.3 FL
MICROALBUMIN 24H UR DL<=1MG/L-MCNC: 2 MG/DL
MICROALBUMIN/CREAT 24H UR-RTO: 16 MG/G
MICROSCOPIC-UA: NORMAL
MONOCYTES # BLD AUTO: 0.84 K/UL
MONOCYTES NFR BLD AUTO: 12.4 %
NEUTROPHILS # BLD AUTO: 4.25 K/UL
NEUTROPHILS NFR BLD AUTO: 62.5 %
NITRITE URINE: NEGATIVE
NONHDLC SERPL-MCNC: 117 MG/DL
PH URINE: 5.5
PLATELET # BLD AUTO: 159 K/UL
POTASSIUM SERPL-SCNC: 4.5 MMOL/L
PROT SERPL-MCNC: 6.8 G/DL
PROTEIN URINE: NEGATIVE MG/DL
PSA SERPL-MCNC: 1.41 NG/ML
RBC # BLD: 4.55 M/UL
RBC # FLD: 14.6 %
RED BLOOD CELLS URINE: 0 /HPF
SODIUM SERPL-SCNC: 146 MMOL/L
SPECIFIC GRAVITY URINE: 1.02
T4 SERPL-MCNC: 5.3 UG/DL
TRIGL SERPL-MCNC: 244 MG/DL
TSH SERPL-ACNC: 0.93 UIU/ML
URATE SERPL-MCNC: 6.9 MG/DL
UROBILINOGEN URINE: 0.2 MG/DL
WBC # FLD AUTO: 6.8 K/UL
WHITE BLOOD CELLS URINE: 0 /HPF

## 2024-06-16 ENCOUNTER — NON-APPOINTMENT (OUTPATIENT)
Age: 74
End: 2024-06-16

## 2024-06-22 ENCOUNTER — RX RENEWAL (OUTPATIENT)
Age: 74
End: 2024-06-22

## 2024-06-26 ENCOUNTER — OFFICE (OUTPATIENT)
Facility: LOCATION | Age: 74
Setting detail: OPHTHALMOLOGY
End: 2024-06-26
Payer: MEDICARE

## 2024-06-26 DIAGNOSIS — H52.4: ICD-10-CM

## 2024-06-26 DIAGNOSIS — H02.834: ICD-10-CM

## 2024-06-26 DIAGNOSIS — H18.513: ICD-10-CM

## 2024-06-26 DIAGNOSIS — H40.1123: ICD-10-CM

## 2024-06-26 DIAGNOSIS — H02.831: ICD-10-CM

## 2024-06-26 DIAGNOSIS — H40.1111: ICD-10-CM

## 2024-06-26 DIAGNOSIS — H35.373: ICD-10-CM

## 2024-06-26 PROCEDURE — 99213 OFFICE O/P EST LOW 20 MIN: CPT | Performed by: OPHTHALMOLOGY

## 2024-06-26 PROCEDURE — 92015 DETERMINE REFRACTIVE STATE: CPT | Mod: GA | Performed by: OPHTHALMOLOGY

## 2024-06-26 PROCEDURE — 92133 CPTRZD OPH DX IMG PST SGM ON: CPT | Performed by: OPHTHALMOLOGY

## 2024-06-26 ASSESSMENT — CONFRONTATIONAL VISUAL FIELD TEST (CVF)
OD_FINDINGS: FULL
OS_FINDINGS: CONSTRICTION

## 2024-09-04 ENCOUNTER — OFFICE (OUTPATIENT)
Facility: LOCATION | Age: 74
Setting detail: OPHTHALMOLOGY
End: 2024-09-04
Payer: MEDICARE

## 2024-09-04 DIAGNOSIS — H16.223: ICD-10-CM

## 2024-09-04 DIAGNOSIS — H18.513: ICD-10-CM

## 2024-09-04 DIAGNOSIS — H40.1123: ICD-10-CM

## 2024-09-04 DIAGNOSIS — H40.1111: ICD-10-CM

## 2024-09-04 DIAGNOSIS — H35.373: ICD-10-CM

## 2024-09-04 PROCEDURE — 92250 FUNDUS PHOTOGRAPHY W/I&R: CPT | Performed by: OPHTHALMOLOGY

## 2024-09-04 PROCEDURE — 99213 OFFICE O/P EST LOW 20 MIN: CPT | Performed by: OPHTHALMOLOGY

## 2024-09-04 PROCEDURE — 92083 EXTENDED VISUAL FIELD XM: CPT | Performed by: OPHTHALMOLOGY

## 2024-09-04 ASSESSMENT — CONFRONTATIONAL VISUAL FIELD TEST (CVF)
OS_FINDINGS: FULL
OD_FINDINGS: FULL

## 2024-12-16 ENCOUNTER — RX RENEWAL (OUTPATIENT)
Age: 74
End: 2024-12-16

## 2024-12-18 ENCOUNTER — APPOINTMENT (OUTPATIENT)
Dept: FAMILY MEDICINE | Facility: CLINIC | Age: 74
End: 2024-12-18
Payer: MEDICARE

## 2024-12-18 VITALS
WEIGHT: 225 LBS | HEART RATE: 78 BPM | OXYGEN SATURATION: 95 % | HEIGHT: 68 IN | SYSTOLIC BLOOD PRESSURE: 138 MMHG | BODY MASS INDEX: 34.1 KG/M2 | TEMPERATURE: 98 F | DIASTOLIC BLOOD PRESSURE: 70 MMHG

## 2024-12-18 VITALS — DIASTOLIC BLOOD PRESSURE: 76 MMHG | SYSTOLIC BLOOD PRESSURE: 126 MMHG | RESPIRATION RATE: 16 BRPM | HEART RATE: 76 BPM

## 2024-12-18 DIAGNOSIS — C95.90 LEUKEMIA, UNSPECIFIED NOT HAVING ACHIEVED REMISSION: ICD-10-CM

## 2024-12-18 DIAGNOSIS — I10 ESSENTIAL (PRIMARY) HYPERTENSION: ICD-10-CM

## 2024-12-18 DIAGNOSIS — E78.5 HYPERLIPIDEMIA, UNSPECIFIED: ICD-10-CM

## 2024-12-18 PROCEDURE — G0008: CPT

## 2024-12-18 PROCEDURE — 99214 OFFICE O/P EST MOD 30 MIN: CPT

## 2024-12-18 PROCEDURE — 90662 IIV NO PRSV INCREASED AG IM: CPT

## 2024-12-19 ENCOUNTER — MED ADMIN CHARGE (OUTPATIENT)
Age: 74
End: 2024-12-19

## 2024-12-19 LAB
ALBUMIN SERPL ELPH-MCNC: 4.1 G/DL
ALP BLD-CCNC: 93 U/L
ALT SERPL-CCNC: 6 U/L
ANION GAP SERPL CALC-SCNC: 13 MMOL/L
APPEARANCE: CLEAR
AST SERPL-CCNC: 14 U/L
BACTERIA: NEGATIVE /HPF
BASOPHILS # BLD AUTO: 0.08 K/UL
BASOPHILS NFR BLD AUTO: 1 %
BILIRUB SERPL-MCNC: 0.3 MG/DL
BILIRUBIN URINE: NEGATIVE
BLOOD URINE: NEGATIVE
BUN SERPL-MCNC: 21 MG/DL
CALCIUM SERPL-MCNC: 9.2 MG/DL
CAST: 0 /LPF
CHLORIDE SERPL-SCNC: 107 MMOL/L
CHOLEST SERPL-MCNC: 165 MG/DL
CO2 SERPL-SCNC: 24 MMOL/L
COLOR: YELLOW
CREAT SERPL-MCNC: 1.15 MG/DL
CREAT SPEC-SCNC: 132 MG/DL
EGFR: 67 ML/MIN/1.73M2
EOSINOPHIL # BLD AUTO: 0.32 K/UL
EOSINOPHIL NFR BLD AUTO: 4.1 %
EPITHELIAL CELLS: 0 /HPF
ESTIMATED AVERAGE GLUCOSE: 111 MG/DL
GLUCOSE QUALITATIVE U: NEGATIVE MG/DL
GLUCOSE SERPL-MCNC: 82 MG/DL
HBA1C MFR BLD HPLC: 5.5 %
HCT VFR BLD CALC: 43 %
HDLC SERPL-MCNC: 31 MG/DL
HGB BLD-MCNC: 14 G/DL
IMM GRANULOCYTES NFR BLD AUTO: 0.4 %
KETONES URINE: NEGATIVE MG/DL
LDLC SERPL CALC-MCNC: 66 MG/DL
LEUKOCYTE ESTERASE URINE: NEGATIVE
LYMPHOCYTES # BLD AUTO: 1.75 K/UL
LYMPHOCYTES NFR BLD AUTO: 22.2 %
MAN DIFF?: NORMAL
MCHC RBC-ENTMCNC: 30.6 PG
MCHC RBC-ENTMCNC: 32.6 G/DL
MCV RBC AUTO: 94.1 FL
MICROALBUMIN 24H UR DL<=1MG/L-MCNC: 3.4 MG/DL
MICROALBUMIN/CREAT 24H UR-RTO: 25 MG/G
MICROSCOPIC-UA: NORMAL
MONOCYTES # BLD AUTO: 1.01 K/UL
MONOCYTES NFR BLD AUTO: 12.8 %
NEUTROPHILS # BLD AUTO: 4.68 K/UL
NEUTROPHILS NFR BLD AUTO: 59.5 %
NITRITE URINE: NEGATIVE
NONHDLC SERPL-MCNC: 133 MG/DL
PH URINE: 5.5
PLATELET # BLD AUTO: 157 K/UL
POTASSIUM SERPL-SCNC: 4.8 MMOL/L
PROT SERPL-MCNC: 6.8 G/DL
PROTEIN URINE: NEGATIVE MG/DL
RBC # BLD: 4.57 M/UL
RBC # FLD: 14.3 %
RED BLOOD CELLS URINE: 1 /HPF
SODIUM SERPL-SCNC: 144 MMOL/L
SPECIFIC GRAVITY URINE: 1.02
T4 SERPL-MCNC: 6.6 UG/DL
TRIGL SERPL-MCNC: 437 MG/DL
TSH SERPL-ACNC: 0.78 UIU/ML
URATE SERPL-MCNC: 6.5 MG/DL
UROBILINOGEN URINE: 0.2 MG/DL
WBC # FLD AUTO: 7.87 K/UL
WHITE BLOOD CELLS URINE: 0 /HPF

## 2024-12-27 ENCOUNTER — OFFICE (OUTPATIENT)
Facility: LOCATION | Age: 74
Setting detail: OPHTHALMOLOGY
End: 2024-12-27
Payer: MEDICARE

## 2024-12-27 DIAGNOSIS — H40.1123: ICD-10-CM

## 2024-12-27 DIAGNOSIS — H35.373: ICD-10-CM

## 2024-12-27 DIAGNOSIS — Z96.1: ICD-10-CM

## 2024-12-27 DIAGNOSIS — H16.223: ICD-10-CM

## 2024-12-27 DIAGNOSIS — H31.29: ICD-10-CM

## 2024-12-27 DIAGNOSIS — H18.513: ICD-10-CM

## 2024-12-27 DIAGNOSIS — H40.1111: ICD-10-CM

## 2024-12-27 PROCEDURE — 99213 OFFICE O/P EST LOW 20 MIN: CPT | Performed by: OPHTHALMOLOGY

## 2024-12-27 PROCEDURE — 92133 CPTRZD OPH DX IMG PST SGM ON: CPT | Performed by: OPHTHALMOLOGY

## 2024-12-27 ASSESSMENT — CORNEAL DYSTROPHY
OD_DYSTROPHY: ARCUS
OS_DYSTROPHY: ARCUS

## 2024-12-27 ASSESSMENT — CORNEAL DYSTROPHY - POSTERIOR
OS_POSTERIORDYSTROPHY: GUTTATA
OD_POSTERIORDYSTROPHY: GUTTATA

## 2024-12-27 ASSESSMENT — CONFRONTATIONAL VISUAL FIELD TEST (CVF)
OS_FINDINGS: FULL
OD_FINDINGS: FULL

## 2024-12-27 ASSESSMENT — SUPERFICIAL PUNCTATE KERATITIS (SPK)
OD_SPK: T
OS_SPK: T

## 2024-12-27 ASSESSMENT — TONOMETRY
OD_IOP_MMHG: 14
OS_IOP_MMHG: 11

## 2025-01-03 ASSESSMENT — REFRACTION_MANIFEST
OS_VA2: 20/LP
OS_AXIS: 141
OD_AXIS: 034
OD_ADD: +3.25
OS_CYLINDER: +0.75
OS_VA1: 20/LP
OD_CYLINDER: +1.00
OS_ADD: +3.25
OS_SPHERE: -1.00
OD_VA1: 20/30+2
OD_VA2: 20/25
OD_SPHERE: -1.50

## 2025-01-03 ASSESSMENT — REFRACTION_AUTOREFRACTION
OD_CYLINDER: +0.75
OS_CYLINDER: +1.25
OS_SPHERE: -2.00
OD_AXIS: 24
OD_SPHERE: -1.00
OS_AXIS: 137

## 2025-01-03 ASSESSMENT — KERATOMETRY
OS_K2POWER_DIOPTERS: 40.75
OS_AXISANGLE_DEGREES: 131
OD_AXISANGLE_DEGREES: 022
OD_K1POWER_DIOPTERS: 40.00
OD_K2POWER_DIOPTERS: 40.50
OS_K1POWER_DIOPTERS: 40.75

## 2025-01-03 ASSESSMENT — REFRACTION_CURRENTRX
OS_CYLINDER: +1.00
OD_OVR_VA: 20/
OD_SPHERE: -1.50
OS_AXIS: 146
OS_OVR_VA: 20/
OS_SPHERE: -1.25
OD_CYLINDER: +1.00
OD_AXIS: 033

## 2025-01-03 ASSESSMENT — VISUAL ACUITY
OS_BCVA: 20/40
OD_BCVA: 20/NLP

## 2025-01-31 ENCOUNTER — RX RENEWAL (OUTPATIENT)
Age: 75
End: 2025-01-31

## 2025-02-01 RX ORDER — OLMESARTAN MEDOXOMIL / AMLODIPINE BESYLATE / HYDROCHLOROTHIAZIDE 40; 10; 12.5 MG/1; MG/1; MG/1
40-10-12.5 TABLET, FILM COATED ORAL
Qty: 90 | Refills: 0 | Status: ACTIVE | COMMUNITY
Start: 2025-01-31 | End: 1900-01-01

## 2025-03-31 NOTE — HISTORY OF PRESENT ILLNESS
LDL at goal (46). Continue Repatha 140mg subcutaneous injection every 14 days, rosuvastatin 40mg daily and ezetimibe 10mg daily.    [Hyperlipidemia] : Hyperlipidemia [Hypertension] : Hypertension [Checks BP Regularly] : The patient checks ~his/her~ blood pressure regularly [<130/90] : Target blood pressure is  <130/90 [Target goal met] : BP target goal met [Doing Well] : Patient is doing well [Managed with medications] : managed with  medication [Moderate Intensity Therapy] : Patient is currently on moderate intensity statin  therapy [FreeTextEntry6] : p[t here for management of htn hld  p had  diarrhea and  had blood in stool was  taking meloxicam  was  in [place]

## 2025-04-29 ENCOUNTER — OFFICE (OUTPATIENT)
Facility: LOCATION | Age: 75
Setting detail: OPHTHALMOLOGY
End: 2025-04-29
Payer: MEDICARE

## 2025-04-29 DIAGNOSIS — H52.4: ICD-10-CM

## 2025-04-29 DIAGNOSIS — H16.223: ICD-10-CM

## 2025-04-29 DIAGNOSIS — H18.513: ICD-10-CM

## 2025-04-29 DIAGNOSIS — H35.373: ICD-10-CM

## 2025-04-29 DIAGNOSIS — H40.1123: ICD-10-CM

## 2025-04-29 DIAGNOSIS — H40.1111: ICD-10-CM

## 2025-04-29 PROBLEM — H52.13 MYOPIA; BOTH EYES: Status: ACTIVE | Noted: 2025-04-29

## 2025-04-29 PROBLEM — H33.8 RETINAL DETACHMENT, OTHER ; LEFT EYE: Status: ACTIVE | Noted: 2025-04-29

## 2025-04-29 PROCEDURE — 92015 DETERMINE REFRACTIVE STATE: CPT | Mod: GA | Performed by: OPHTHALMOLOGY

## 2025-04-29 PROCEDURE — 92250 FUNDUS PHOTOGRAPHY W/I&R: CPT | Performed by: OPHTHALMOLOGY

## 2025-04-29 PROCEDURE — 92014 COMPRE OPH EXAM EST PT 1/>: CPT | Performed by: OPHTHALMOLOGY

## 2025-04-29 ASSESSMENT — CONFRONTATIONAL VISUAL FIELD TEST (CVF)
OD_FINDINGS: FULL
OS_FINDINGS: FULL

## 2025-04-29 ASSESSMENT — VISUAL ACUITY
OD_BCVA: 20/LP
OS_BCVA: 20/20-2

## 2025-04-29 ASSESSMENT — REFRACTION_MANIFEST
OS_VA2: 20/LP
OD_AXIS: 030
OS_SPHERE: -1.00
OD_VA1: 20/30+2
OD_SPHERE: -1.50
OS_ADD: +2.75
OD_SPHERE: -1.25
OU_VA: 20/20
OD_AXIS: 034
OD_ADD: +2.75
OS_CYLINDER: +1.00
OD_ADD: +3.25
OS_VA1: 20/LP
OD_CYLINDER: +0.50
OD_VA2: 20/25
OS_AXIS: 141
OS_ADD: +3.25
OS_AXIS: 145
OS_CYLINDER: +0.75
OS_SPHERE: -1.25
OS_VA1: 20/LP
OD_CYLINDER: +1.00
OD_VA1: 20/20

## 2025-04-29 ASSESSMENT — KERATOMETRY
OS_K1POWER_DIOPTERS: 40.75
OS_AXISANGLE_DEGREES: 131
OS_K2POWER_DIOPTERS: 40.75
OD_K1POWER_DIOPTERS: 40.00
OD_K2POWER_DIOPTERS: 40.50
OD_AXISANGLE_DEGREES: 022

## 2025-04-29 ASSESSMENT — REFRACTION_CURRENTRX
OS_AXIS: 144
OD_AXIS: 033
OS_VPRISM_DIRECTION: SV
OD_VPRISM_DIRECTION: SV
OD_OVR_VA: 20/
OS_OVR_VA: 20/
OS_SPHERE: -1.25
OD_SPHERE: -1.50
OD_CYLINDER: +1.00
OS_CYLINDER: +1.00

## 2025-04-29 ASSESSMENT — TONOMETRY
OD_IOP_MMHG: 15
OS_IOP_MMHG: 14

## 2025-04-29 ASSESSMENT — REFRACTION_AUTOREFRACTION
OS_AXIS: 135
OS_SPHERE: -2.00
OD_SPHERE: -0.75
OS_CYLINDER: +1.00
OD_CYLINDER: +0.50
OD_AXIS: 120

## 2025-04-29 ASSESSMENT — SUPERFICIAL PUNCTATE KERATITIS (SPK)
OD_SPK: T
OS_SPK: T

## 2025-04-29 ASSESSMENT — CORNEAL DYSTROPHY
OD_DYSTROPHY: ARCUS
OS_DYSTROPHY: ARCUS

## 2025-04-29 ASSESSMENT — CORNEAL DYSTROPHY - POSTERIOR
OD_POSTERIORDYSTROPHY: GUTTATA
OS_POSTERIORDYSTROPHY: GUTTATA

## 2025-05-02 ENCOUNTER — RX RENEWAL (OUTPATIENT)
Age: 75
End: 2025-05-02

## 2025-05-02 RX ORDER — OLMESARTAN MEDOXOMIL / AMLODIPINE BESYLATE / HYDROCHLOROTHIAZIDE 20; 5; 12.5 MG/1; MG/1; MG/1
20-5-12.5 TABLET, FILM COATED ORAL
Qty: 90 | Refills: 0 | Status: ACTIVE | COMMUNITY
Start: 2025-05-02 | End: 1900-01-01

## 2025-06-23 ENCOUNTER — RX RENEWAL (OUTPATIENT)
Age: 75
End: 2025-06-23

## 2025-06-23 ENCOUNTER — APPOINTMENT (OUTPATIENT)
Dept: FAMILY MEDICINE | Facility: CLINIC | Age: 75
End: 2025-06-23
Payer: MEDICARE

## 2025-06-23 ENCOUNTER — NON-APPOINTMENT (OUTPATIENT)
Age: 75
End: 2025-06-23

## 2025-06-23 VITALS
WEIGHT: 220 LBS | HEIGHT: 68 IN | TEMPERATURE: 97.5 F | OXYGEN SATURATION: 98 % | BODY MASS INDEX: 33.34 KG/M2 | DIASTOLIC BLOOD PRESSURE: 80 MMHG | SYSTOLIC BLOOD PRESSURE: 140 MMHG | HEART RATE: 88 BPM

## 2025-06-23 VITALS — DIASTOLIC BLOOD PRESSURE: 70 MMHG | RESPIRATION RATE: 16 BRPM | HEART RATE: 72 BPM | SYSTOLIC BLOOD PRESSURE: 130 MMHG

## 2025-06-23 PROCEDURE — 99214 OFFICE O/P EST MOD 30 MIN: CPT

## 2025-06-23 RX ORDER — FLUTICASONE PROPIONATE 50 UG/1
50 SPRAY NASAL DAILY
Qty: 48 | Refills: 0 | Status: ACTIVE | COMMUNITY
Start: 2025-06-23 | End: 1900-01-01

## 2025-06-23 RX ORDER — CODEINE PHOSPHATE AND GUAIFENESIN 10; 100 MG/5ML; MG/5ML
100-10 SOLUTION ORAL
Qty: 1 | Refills: 0 | Status: ACTIVE | COMMUNITY
Start: 2025-06-23 | End: 1900-01-01

## 2025-06-24 LAB
RESP PATH DNA+RNA PNL NPH NAA+NON-PROBE: NOT DETECTED
SARS-COV-2 RNA RESP QL NAA+PROBE: NOT DETECTED

## 2025-06-25 ENCOUNTER — OFFICE (OUTPATIENT)
Facility: LOCATION | Age: 75
Setting detail: OPHTHALMOLOGY
End: 2025-06-25
Payer: MEDICARE

## 2025-06-25 DIAGNOSIS — H16.221: ICD-10-CM

## 2025-06-25 DIAGNOSIS — H10.431: ICD-10-CM

## 2025-06-25 PROBLEM — H18.511 ENDOTHELIAL CORNEAL DYSTROPHY;  , RIGHT EYE: Status: ACTIVE | Noted: 2025-06-25

## 2025-06-25 PROCEDURE — 99213 OFFICE O/P EST LOW 20 MIN: CPT | Performed by: OPHTHALMOLOGY

## 2025-06-25 ASSESSMENT — REFRACTION_MANIFEST
OD_SPHERE: -1.50
OS_CYLINDER: +1.00
OS_CYLINDER: +0.75
OD_SPHERE: -1.25
OS_SPHERE: -1.25
OS_ADD: +2.75
OS_SPHERE: -1.00
OD_ADD: +2.75
OD_VA2: 20/25
OD_VA1: 20/30+2
OS_AXIS: 141
OS_VA1: 20/LP
OD_CYLINDER: +1.00
OD_ADD: +3.25
OS_ADD: +3.25
OS_AXIS: 145
OD_VA1: 20/20
OD_AXIS: 034
OS_VA1: 20/LP
OS_VA2: 20/LP
OD_AXIS: 030
OD_CYLINDER: +0.50
OU_VA: 20/20

## 2025-06-25 ASSESSMENT — REFRACTION_CURRENTRX
OS_OVR_VA: 20/
OD_VPRISM_DIRECTION: SV
OD_CYLINDER: +1.00
OD_SPHERE: -1.50
OD_AXIS: 033
OS_CYLINDER: +1.00
OS_AXIS: 144
OS_VPRISM_DIRECTION: SV
OD_OVR_VA: 20/
OS_SPHERE: -1.25

## 2025-06-25 ASSESSMENT — KERATOMETRY
OS_K2POWER_DIOPTERS: 40.75
OD_K1POWER_DIOPTERS: 40.00
OD_K2POWER_DIOPTERS: 40.50
OS_AXISANGLE_DEGREES: 131
OD_AXISANGLE_DEGREES: 022
OS_K1POWER_DIOPTERS: 40.75

## 2025-06-25 ASSESSMENT — SUPERFICIAL PUNCTATE KERATITIS (SPK)
OS_SPK: T
OD_SPK: T

## 2025-06-25 ASSESSMENT — CORNEAL DYSTROPHY - POSTERIOR
OD_POSTERIORDYSTROPHY: GUTTATA
OS_POSTERIORDYSTROPHY: GUTTATA

## 2025-06-25 ASSESSMENT — CONFRONTATIONAL VISUAL FIELD TEST (CVF)
OD_FINDINGS: FULL
OS_FINDINGS: FULL

## 2025-06-25 ASSESSMENT — REFRACTION_AUTOREFRACTION
OD_SPHERE: -0.75
OS_CYLINDER: +1.00
OS_SPHERE: -2.00
OS_AXIS: 135
OD_CYLINDER: +0.50
OD_AXIS: 120

## 2025-06-25 ASSESSMENT — CORNEAL DYSTROPHY
OS_DYSTROPHY: ARCUS
OD_DYSTROPHY: ARCUS

## 2025-06-25 ASSESSMENT — VISUAL ACUITY
OD_BCVA: 20/HM
OS_BCVA: 20/25

## 2025-06-25 ASSESSMENT — TONOMETRY: OD_IOP_MMHG: 12

## 2025-08-29 ENCOUNTER — OFFICE (OUTPATIENT)
Facility: LOCATION | Age: 75
Setting detail: OPHTHALMOLOGY
End: 2025-08-29
Payer: MEDICARE

## 2025-08-29 DIAGNOSIS — H40.1111: ICD-10-CM

## 2025-08-29 DIAGNOSIS — H40.1123: ICD-10-CM

## 2025-08-29 PROCEDURE — 92250 FUNDUS PHOTOGRAPHY W/I&R: CPT | Performed by: OPHTHALMOLOGY

## 2025-08-29 PROCEDURE — 92083 EXTENDED VISUAL FIELD XM: CPT | Performed by: OPHTHALMOLOGY

## 2025-08-29 PROCEDURE — 99213 OFFICE O/P EST LOW 20 MIN: CPT | Performed by: OPHTHALMOLOGY

## 2025-08-29 ASSESSMENT — KERATOMETRY
OS_AXISANGLE_DEGREES: 131
OD_AXISANGLE_DEGREES: 022
OD_K1POWER_DIOPTERS: 40.00
OS_K1POWER_DIOPTERS: 40.75
OD_K2POWER_DIOPTERS: 40.50
OS_K2POWER_DIOPTERS: 40.75

## 2025-08-29 ASSESSMENT — REFRACTION_MANIFEST
OS_AXIS: 145
OD_AXIS: 034
OD_VA2: 20/25
OS_SPHERE: -1.25
OS_VA2: 20/LP
OS_ADD: +3.25
OD_CYLINDER: +0.50
OS_SPHERE: -1.00
OS_CYLINDER: +1.00
OS_VA1: 20/LP
OD_VA1: 20/20
OD_ADD: +3.25
OD_SPHERE: -1.25
OD_VA1: 20/30+2
OD_AXIS: 030
OU_VA: 20/20
OS_CYLINDER: +0.75
OS_VA1: 20/LP
OS_ADD: +2.75
OD_CYLINDER: +1.00
OD_ADD: +2.75
OS_AXIS: 141
OD_SPHERE: -1.50

## 2025-08-29 ASSESSMENT — REFRACTION_CURRENTRX
OD_AXIS: 041
OS_AXIS: 142
OD_VPRISM_DIRECTION: SV
OS_SPHERE: -1.25
OS_CYLINDER: +1.00
OD_SPHERE: -1.50
OD_CYLINDER: +1.00
OS_OVR_VA: 20/
OD_OVR_VA: 20/
OS_VPRISM_DIRECTION: SV

## 2025-08-29 ASSESSMENT — REFRACTION_AUTOREFRACTION
OS_AXIS: 144
OD_CYLINDER: +0.50
OD_AXIS: 027
OD_SPHERE: -0.75
OS_SPHERE: -1.75
OS_CYLINDER: +1.00

## 2025-08-29 ASSESSMENT — CONFRONTATIONAL VISUAL FIELD TEST (CVF)
OS_FINDINGS: FULL
OD_FINDINGS: FULL

## 2025-08-29 ASSESSMENT — CORNEAL DYSTROPHY
OS_DYSTROPHY: ARCUS
OD_DYSTROPHY: ARCUS

## 2025-08-29 ASSESSMENT — VISUAL ACUITY
OD_BCVA: 20/HM
OS_BCVA: 20/25

## 2025-08-29 ASSESSMENT — TONOMETRY: OD_IOP_MMHG: 14

## 2025-08-29 ASSESSMENT — CORNEAL DYSTROPHY - POSTERIOR
OS_POSTERIORDYSTROPHY: GUTTATA
OD_POSTERIORDYSTROPHY: GUTTATA

## 2025-08-29 ASSESSMENT — SUPERFICIAL PUNCTATE KERATITIS (SPK)
OS_SPK: T
OD_SPK: T

## 2025-09-17 ENCOUNTER — RX RENEWAL (OUTPATIENT)
Age: 75
End: 2025-09-17

## 2025-09-18 ENCOUNTER — RX RENEWAL (OUTPATIENT)
Age: 75
End: 2025-09-18